# Patient Record
Sex: MALE | Race: WHITE
[De-identification: names, ages, dates, MRNs, and addresses within clinical notes are randomized per-mention and may not be internally consistent; named-entity substitution may affect disease eponyms.]

---

## 2018-04-03 ENCOUNTER — HOSPITAL ENCOUNTER (OUTPATIENT)
Dept: HOSPITAL 80 - FIMAGING | Age: 83
End: 2018-04-03
Attending: PSYCHIATRY & NEUROLOGY
Payer: COMMERCIAL

## 2018-04-03 DIAGNOSIS — R41.3: ICD-10-CM

## 2018-04-03 DIAGNOSIS — R26.9: Primary | ICD-10-CM

## 2018-04-06 ENCOUNTER — HOSPITAL ENCOUNTER (EMERGENCY)
Dept: HOSPITAL 80 - FED | Age: 83
Discharge: HOME | End: 2018-04-06
Payer: COMMERCIAL

## 2018-04-06 VITALS — DIASTOLIC BLOOD PRESSURE: 76 MMHG | SYSTOLIC BLOOD PRESSURE: 164 MMHG

## 2018-04-06 DIAGNOSIS — E11.9: ICD-10-CM

## 2018-04-06 DIAGNOSIS — Z79.84: ICD-10-CM
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
## 2018-04-06 DIAGNOSIS — E86.0: Primary | ICD-10-CM

## 2018-04-06 DIAGNOSIS — I10: ICD-10-CM

## 2018-04-06 DIAGNOSIS — I25.10: ICD-10-CM

## 2018-04-06 DIAGNOSIS — Z04.3: ICD-10-CM

## 2018-04-06 DIAGNOSIS — E86.9: ICD-10-CM

## 2018-04-06 DIAGNOSIS — Z79.82: ICD-10-CM

## 2018-04-06 LAB — PLATELET # BLD: 164 10^3/UL (ref 150–400)

## 2018-04-06 NOTE — EDPHY
H & P


Stated Complaint: FALL AT 2030,, "HAVING A BAD DAY", SLEEPY, CONFUSION


Time Seen by Provider: 04/06/18 01:04


HPI/ROS: 





Chief Complaint:  Confusion, high blood sugar, fall





HPI:  82-year-old male with history of diabetes, coronary artery disease.  Per 

family patient has been seeming a little bit more confused since 7 o'clock 

tonight.  Patient was leaning over to get a pair of the refrigerator when he 

fell to the ground.  Did not hit his head.  No loss of consciousness.  He was 

helped up by a neighbor.  Son arrived home about 11 o'clock and checked in 

notice blood pressure was in the high 200s.  No recent fevers or chills.  No 

cough.  No chest pain shortness of breath.  No syncope or fainting.  Family 

called nurse line advised them to bring him in for further evaluation.





ROS:  10 point Review of Systems is negative except as noted in the HPI.





PMH:  Hypertension, diabetes, prostate disease, coronary artery disease





Social History: No smoking, no alcohol,  no recreational drug use





Family History: non-contributory





Physical Exam:


Gen: Awake, Alert, No Distress


HEENT:  


     Nose: no rhinorrhea


     Eyes: PERRLA, EOMI


     Mouth:  Dry mucous membranes 


Neck: Supple, no JVD


Chest: nontender, lungs clear to auscultation


Heart: S1, S2 normal, no murmur


Abd: Soft, non-tender, no guarding


Back: no CVA tenderness, no midline tenderness 


Ext: no edema, non-tender


Skin: no rash


Neuro: CN II-XII intact, Sensation grossly intact, Strength 5/5 in bilateral 

upper and lower extremities





- Personal History


Current Tetanus/Diphtheria Vaccine: Unsure





- Medical/Surgical History


Hx Asthma: No


Hx Chronic Respiratory Disease: No


Hx Diabetes: Yes


Hx Cardiac Disease: Yes


Hx Renal Disease: No


Hx Cirrhosis: No


Hx Alcoholism: No


Hx HIV/AIDS: No


Hx Splenectomy or Spleen Trauma: No


Other PMH: 3 stents 1999, BPH, urinary retention x3, HIGH CHOL, CATARACT SURG, 

DIABETES





- Social History


Smoking Status: Never smoked


Constitutional: 


 Initial Vital Signs











Temperature (C)  36.5 C   04/06/18 00:43


 


Heart Rate  66   04/06/18 00:43


 


Respiratory Rate  18   04/06/18 00:43


 


Blood Pressure  131/68 H  04/06/18 00:43


 


O2 Sat (%)  97   04/06/18 00:43








 











O2 Delivery Mode               Room Air














Allergies/Adverse Reactions: 


 





pseudoephedrine HCl [From Sudafed] Allergy (Unknown, Verified 04/06/18 00:40)


 


amoxicillin [From Augmentin] Allergy (Verified 04/06/18 00:40)


 


clavulanic acid [From Augmentin] Allergy (Verified 04/06/18 00:40)


 


dextromethorphan Allergy (Verified 04/06/18 00:40)


 


guaifenesin [Guaifenesin] Allergy (Verified 04/06/18 00:40)


 


pseudoephedrine Allergy (Verified 04/06/18 00:40)


 








Home Medications: 














 Medication  Instructions  Recorded


 


Insulin Glargine [Lantus Insulin 18 unit SQ HS 02/08/12





Vial]  


 


Lisinopril [Prinivil] 20 mg PO DAILY 02/08/12


 


Metformin HCl [Metformin HCl ER] 500 mg PO DAILY 02/08/12


 


Sotalol HCl [Sotalol] 80 mg PO BID 02/08/12


 


Aspirin 81mg (*)  04/06/18


 


Atorvastatin Calcium  04/06/18


 


Docusate Sodium  04/06/18


 


Metformin 1000 mg  04/06/18


 


Tamsulosin HCl  04/06/18














Medical Decision Making


ED Course/Re-evaluation: 





Patient is improved.  He is very clinically dehydrated with elevated BUN.  

Blood sugar is new to 100. Patient is improved after a L fluid.  He is awake 

alert acting appropriately.  No signs of infection.  Will discharge with follow-

up with primary care physician, return for any concerns.  He is here with a 

signed to take him home.





- Data Points


Laboratory Results: 


 Laboratory Results





 04/06/18 01:34 





 04/06/18 01:34 





 











  04/06/18 04/06/18 04/06/18





  02:10 01:34 01:34


 


WBC    12.33 10^3/uL H 10^3/uL  





    (3.80-9.50)  


 


RBC    4.54 10^6/uL 10^6/uL  





    (4.40-6.38)  


 


Hgb    13.5 g/dL L g/dL  





    (13.7-17.5)  


 


Hct    40.1 % %  





    (40.0-51.0)  


 


MCV    88.3 fL fL  





    (81.5-99.8)  


 


MCH    29.7 pg pg  





    (27.9-34.1)  


 


MCHC    33.7 g/dL g/dL  





    (32.4-36.7)  


 


RDW    14.6 % %  





    (11.5-15.2)  


 


Plt Count    164 10^3/uL 10^3/uL  





    (150-400)  


 


MPV    10.5 fL fL  





    (8.7-11.7)  


 


Neut % (Auto)    38.7 % L %  





    (39.3-74.2)  


 


Lymph % (Auto)    46.6 % H %  





    (15.0-45.0)  


 


Mono % (Auto)    10.3 % %  





    (4.5-13.0)  


 


Eos % (Auto)    3.5 % %  





    (0.6-7.6)  


 


Baso % (Auto)    0.6 % %  





    (0.3-1.7)  


 


Nucleat RBC Rel Count    0.2 % %  





    (0.0-0.2)  


 


Absolute Neuts (auto)    4.77 10^3/uL 10^3/uL  





    (1.70-6.50)  


 


Absolute Lymphs (auto)    5.75 10^3/uL H 10^3/uL  





    (1.00-3.00)  


 


Absolute Monos (auto)    1.27 10^3/uL H 10^3/uL  





    (0.30-0.80)  


 


Absolute Eos (auto)    0.43 10^3/uL H 10^3/uL  





    (0.03-0.40)  


 


Absolute Basos (auto)    0.07 10^3/uL 10^3/uL  





    (0.02-0.10)  


 


Absolute Nucleated RBC    0.03 10^3/uL H 10^3/uL  





    (0-0.01)  


 


Immature Gran %    0.3 % %  





    (0.0-1.1)  


 


Immature Gran #    0.04 10^3/uL 10^3/uL  





    (0.00-0.10)  


 


Sodium      141 mEq/L mEq/L





     (135-145) 


 


Potassium      4.9 mEq/L mEq/L





     (3.5-5.2) 


 


Chloride      104 mEq/L mEq/L





     () 


 


Carbon Dioxide      27 mEq/l mEq/l





     (22-31) 


 


Anion Gap      10 mEq/L mEq/L





     (8-16) 


 


BUN      32 mg/dL H mg/dL





     (7-23) 


 


Creatinine      1.0 mg/dL mg/dL





     (0.7-1.3) 


 


Estimated GFR      > 60 





    


 


Glucose      230 mg/dL H mg/dL





     () 


 


Calcium      9.4 mg/dL mg/dL





     (8.5-10.4) 


 


Total Bilirubin      0.5 mg/dL mg/dL





     (0.1-1.4) 


 


AST      20 IU/L IU/L





     (17-59) 


 


ALT      24 IU/L IU/L





     (21-72) 


 


Alkaline Phosphatase      59 IU/L IU/L





     () 


 


Total Protein      6.0 g/dL L g/dL





     (6.3-8.2) 


 


Albumin      3.8 g/dL g/dL





     (3.5-5.0) 


 


Urine Color  YELLOW     





    


 


Urine Appearance  CLEAR     





    


 


Urine pH  5.0     





   (5.0-7.5)   


 


Ur Specific Gravity  1.022     





   (1.002-1.030)   


 


Urine Protein  1+  H     





   (NEGATIVE)   


 


Urine Ketones  NEGATIVE     





   (NEGATIVE)   


 


Urine Blood  NEGATIVE     





   (NEGATIVE)   


 


Urine Nitrate  NEGATIVE     





   (NEGATIVE)   


 


Urine Bilirubin  NEGATIVE     





   (NEGATIVE)   


 


Urine Urobilinogen  NEGATIVE EU EU    





   (0.2-1.0)   


 


Ur Leukocyte Esterase  NEGATIVE     





   (NEGATIVE)   


 


Urine RBC  NONE SEEN /hpf /hpf    





   (0-3)   


 


Urine WBC  1-3 /hpf /hpf    





   (0-3)   


 


Ur Epithelial Cells  NONE SEEN /lpf /lpf    





   (NONE-1+)   


 


Hyaline Casts  25-50 /lpf H /lpf    





   (0-1)   


 


Urine Mucus  TRACE /lpf /lpf    





   (NONE-1+)   


 


Urine Glucose  3+  H     





   (NEGATIVE)   











Medications Given: 


 








Discontinued Medications





Sodium Chloride (Ns)  1,000 mls @ 0 mls/hr IV ONCE ONE; Wide Open


   PRN Reason: Protocol


   Stop: 04/06/18 01:14


   Last Admin: 04/06/18 01:36 Dose:  1,000 mls








Departure





- Departure


Disposition: Home, Routine, Self-Care


Clinical Impression: 


 Dehydration, Fall





Condition: Good


Instructions:  Dehydration (ED), Fall Prevention for Older Adults (ED)


Additional Instructions: 


Follow up with primary care physician in 2-3 days for further evaluation.


Make sure you drink at least 8, 8 oz glasses of water a day.


Return to the emergency department for fainting, chest pain, shortness of breath

, nausea or vomiting, or any other concerns.


Referrals: 


Patient,NotPresent [Primary Care Provider] - As per Instructions

## 2018-05-30 NOTE — GHP
[f 
rep st]



                                                            HISTORY AND PHYSICAL





POST ADMISSION PHYSICIAN EVALUATION AND REHABILITATION TREATMENT PLAN



DATE OF ADMISSION:  05/30/2018



DATE OF EVALUATION:  05/30/2018



TIME OF EVALUATION:  1605.



REFERRING FACILITY:  [facility]



IMPAIRMENT GROUP:  1.1.



DATE OF ONSET:  05/25/2018



REFERRING PHYSICIAN:  Dr. May



CONSULTING PHYSICIANS:  He had a consultation with Neurology, Dr. Bhatia.



REHABILITATION DIAGNOSIS:  Right internal capsule cerebrovascular accident with 
left upper and lower extremity weakness.



ETIOLOGIC DIAGNOSIS:  Left body involvement (right brain).



HISTORY OF PRESENT ILLNESS:  This patient presented with left-sided weakness 
and slurred speech and was admitted to [facility] under a 
stroke alert.  Brain imaging with MRI showed an acute right posterior internal 
capsule CVA.  EKG showed J point elevation in V1 to V3 and T-wave inversion in 
the inferior lateral leads.  Troponin was negative.  Echocardiogram showed 
normal ventricular size and systolic function, ejection fraction of 68%, mild-to
-moderate concentric LVH, and grade 1 diastolic dysfunction.  He had mild 
aortic valve stenosis with a mean aortic valve gradient of 15 mmHg.  There was 
no intracardiac shunt seen.  Though it is not reported directly in the notes 
that I have, presumably he had no dysrhythmias noted on tele monitoring.  He 
was already on lisinopril, aspirin, and atorvastatin.  Clopidogrel was added to 
reduce the likelihood of a recurrent stroke.  He had initial clinical 
deterioration including reduced ability to move his left upper extremity.  A 
repeat MRI showed interval increase in the size of the area of restricted 
diffusion from 3.5 x 5.5 mm to 10 x 6 mm suggesting infarct extension, but 
there was no hemorrhage or midline shift.  He was seen by therapies, was 
medically stable, and was ready for discharge to inpatient rehabilitation.



OTHER STUDIES AND LABS DURING HIS STAY:  On the day of discharge from the 
hospital, 05/30/2018, basic metabolic profile was overall within normal limits.
  He had an elevated glucose at 150.  Creatinine was 0.89 with estimated GFR of 
79.6.  CBC showed an elevated white blood cell count at 14.4.  This apparently 
is chronic.  There was no anemia.  Platelet count was normal.  On differential, 
there was no left shift.  CT angiogram of the head and neck was negative for 
stenosis or other vascular abnormality.  LDL was 71.



PRECAUTIONS:  He is a fall risk.  He has aspiration precautions.



ACTIVE COMORBIDITIES:  He has the tier 3 comorbidities of hemiparesis and of 
diabetes mellitus.



PAST MEDICAL HISTORY:  

1.  Coronary artery disease with history of MI.

2.  Hypertension.

3.  Diabetes mellitus type 2.

4.  Dyslipidemia.

5.  Dementia with donepezil initiated approximately 2 weeks ago.

6.  Benign prostatic hyperplasia.

7.  Spinal stenosis at the L4-L5 level.



PAST SURGICAL HISTORY:  He has had a transurethral resection of the prostate.  
He has had bilateral cataract surgery.  He has had coronary angioplasty and 
stent placement.  He has had knee surgery.



PRE-HOSPITAL MEDICATIONS:  

1.  Aspirin 81 mg p.o. daily.

2.  Atorvastatin 40 mg p.o. daily.

3.  Docusate 100 mg p.o. b.i.d.

4.  Donepezil 5 mg p.o. q.h.s.

5.  Insulin glargine.

6.  Lisinopril 20 mg p.o. daily.

7.  Metformin 1000 mg p.o. b.i.d.

8.  Sotalol 80 mg p.o. b.i.d.

9.  Tamsulosin 0.5 mg p.o. daily.



ADMISSION MEDICATIONS:  

1.  Acetaminophen 650 mg p.o. q.4 hours p.r.n.

2.  Aspirin 81 mg p.o. daily.

3.  Atorvastatin 40 mg p.o. daily.

4.  Bisacodyl 10 mg p.r. daily p.r.n.

5.  Clopidogrel 75 mg p.o. daily.

6.  Docusate 100 mg p.o. b.i.d.

7.  Insulin glargine 6 units subcutaneous q.a.m. and 6 units subcutaneous daily 
at 1600.

8.  Lisinopril 20 mg p.o. daily.

9.  Loratadine 10 mg p.o. daily.

10.  Metformin 500 mg p.o. b.i.d.

11.  Polyethylene glycol 17 g p.o. daily p.r.n.

12.  Sotalol 80 mg p.o. b.i.d.

13.  Tamsulosin 0.4 mg p.o. daily.



ALLERGIES:  Are listed to pseudoephedrine, amoxicillin, clavulanic acid, 
dextromethorphan, and guaifenesin.



PSYCHOSOCIAL HISTORY:  He is .  He and his wife split their time between 
[place] and [place].  Their son lives in [place].  He has worked as an 
.  Is originally from [facility] [facility] and came to [place] in [place].  He served 
in the  in the [employer].  He is a nonsmoker.  He 
enjoys about a half bottle of beer with dinner every night.



FAMILY HISTORY:  Noncontributory.



REVIEW OF SYSTEMS:  He reports he has had a cough and was started on a 
medication in the hospital for it.  He denies dyspnea.  He is aware of his 
inability to move the left side of his body.  He reports this worsened during 
his hospitalization.  He denies loss of sensation.  He denies headache or 
vision changes.  He denies difficulty swallowing.  He denies chest pain or 
palpitations, fevers or chills, nausea, vomiting, constipation, or diarrhea.  
He has a reduced appetite.  He says his feet are always cold, especially his 
right foot.  Otherwise, a 10-point review of systems is negative.



PHYSICAL EXAMINATION:  VITAL SIGNS:  Vitals are not yet available in the chart.
  His systolic blood pressure however was 180 when first measured on arrival in 
the unit.  His weight this morning at the hospital was 68.9 kg.  His vitals 
this morning at the hospital:  Blood pressure was 174/80, heart rate was 61, 
respiratory rate was 18, oxygen saturation was 95% on room air.  Temperature 
was 36 degrees centigrade.  GENERAL:  This is a well-nourished, well-developed, 
elderly man, appears his chronologic age, sitting in a chair in hospital gown, 
cooperative, and in no acute distress.  HEENT:  Extraocular movements are 
intact.  Pupils are equal, round, reactive to light.  Mucous membranes are 
moist.  Dentition is in good condition.  He has an uncrowded airway, Mallampati 
class 2.  There is no posterior oropharyngeal mucus.  NECK:  Supple.  HEART:  
There is a regular rate and rhythm.  There is a 1/6 to 2/6 systolic ejection 
murmur at the left sternal border.  LUNGS:  Clear to auscultation bilaterally.  
ABDOMEN:  Soft, nontender, nondistended with normoactive bowel sounds and no 
hepatosplenomegaly.  EXTREMITIES:  His feet are mildly cyanotic.  There is no 
clubbing or edema.  Radial pulses are 2+ bilaterally, and dorsalis pedis pulses 
are 1+ bilaterally.  NEUROLOGIC:  He is alert and oriented x3.  Cranial nerves 2
-12 are grossly intact.  He has mildly slurred speech.  He has flaccid 
paralysis of the left upper and lower extremities.  Sensation is intact to 
light touch.  Deep tendon reflexes are 2+ bilaterally at the patella and absent 
bilaterally at the Achilles tendons.  Plantar reflex is indeterminate 
bilaterally.  There is no increase in tone.  There is no tremor.  SKIN:  He has 
an approximately 2 cm abrasion on his left posterior thorax over approximately 
T8 or 9.  Otherwise, there is no skin breakdown.



CURRENT LEVEL OF FUNCTION PER THE PRE-ADMISSION SCREEN:  Regarding diet, 
feeding and swallowing, there was no reported swallowing deficit.  He required 
minimal assist for self-feeding.  Regarding dressing, he required maximal 
assist for lower body and minimal assist with for upper body with cues for 
spinal precautions.  For toileting, he was dependent for clothing management 
and hygiene.  Regarding continence, he had episodes of bowel incontinence 
during a therapy session.  Bed mobility required moderate assist for logroll.  
Transfers required moderate assist.  He used a front-wheeled walker and 
wheelchair.  Regarding balance, he needed assistance with sitting balance and 
to attain and maintain midline.  For standing balance, he needed assistance of 
2 with a front-wheeled walker.  Regarding endurance, he had decreased activity 
tolerance.  He was able to stand for approximately 1 minute.  Gait required 
maximal assist with facilitation for pre-gait activities.  Regarding cognition, 
he was noted to be oriented and to follow multistep commands with increased 
time.  He needed assistance to identify errors.  He had decreased awareness of 
deficits.  It was noted that he is left handed. 



On today's exam, there are no significant changes from the preadmission screen.



IMPRESSION:  This is an 82-year-old man with a history of hypertension, diabetes
, dyslipidemia, and coronary artery disease, who suffered a cerebrovascular 
accident to the right internal capsule on 05/25/2018, causing left upper and 
lower extremity hemiparesis.  Hospital evaluation included an echocardiogram 
with no embolic source, head and neck CT angiogram with no embolic source and 
no stenosis, and presumably telemetry monitoring with no report of 
dysrhythmias.  The stroke happened while he was on aspirin, so clopidogrel was 
added for secondary prevention.  He was medically stabilized, participating in 
therapies, and appropriate for inpatient rehabilitation. 



His goal is to complete a rehabilitation stay and then discharge to his son's 
home in [place] with family and home care services.  For a safe discharge, it 
is anticipated that he will require supervision with bed mobility, achieve 
standby assist level for transfers, be able to ambulate for household distances 
with the least restrictive device with contact guard, will be able to negotiate 
a flight of stairs with a rail on the right or cane with contact guard assist, 
and to be able to propel a wheelchair independently.  It is likely he will 
require supervision and cues for bathing and dressing. 



He will receive therapy with physical therapy, occupational therapy, and speech 
and language pathology for 60 minutes per day for each discipline on 5-7 days 
of the week.  His anticipated length of stay is 14 days. 



It is anticipated that upon discharge he will continue to benefit from home 
health services including an aide, occupational therapy, and physical therapy.



PLAN:  

1.  CVA, right internal capsule, with left upper and lower extremity 
hemiparesis in a left-hand-dominant man.  PT and OT to optimize mobility and 
activities of daily living to the standby assist or contact guard assist level.

2.  Dysarthria and history of dementia to be evaluated further by Speech and 
Language Pathology.

3.  Secondary prevention of cerebrovascular accident/neuro recovery.  Continue 
aspirin, clopidogrel, blood pressure and lipid control.  Will initiate 
fluoxetine for neuro recovery at 10 mg daily beginning tomorrow, 05/31/2018.  
If it is tolerated, we will titrate to 20 mg p.o. daily.

4.  Hypertension.  Continue lisinopril.  Blood pressure is elevated upon 
arrival at the rehabilitation unit.  However, this may be due to the stresses 
of his trip from [place].  Continue to monitor blood pressures and will 
adjust and add medications as necessary to achieve target of less than 140/90.

5.  Dyslipidemia.  Continue atorvastatin.

6.  History of dementia, mild.  Per hospital records, the SLUMS was 
administered and he scored 17/30.  He has been on donepezil 5 mg q.h.s. for 
approximately 2 weeks, and this will be continued.

7.  History of cardiac dysrhythmia, unclear what the rhythm abnormality was.  
He is on sotalol, which also contributes to blood pressure control.

8.  Risk for QT prolongation with concurrent donepezil and sotalol.  Will 
obtain a copy of EKG from [facility].  If there is no acute 
T prolongation, will make no change in these medications.

9.  Possible peripheral vascular disease with cyanotic and cold feet.  
Secondary prevention for cerebrovascular accident and coronary artery disease 
will also serve to prevent worsening of circulation to his lower extremities.

10.  Cough, presumably due to seasonal allergies, as he was begun on an 
antihistamine at the hospital.  Will continue cetirizine per hospital 
formulary.  If cough continues, we will evaluate further.

11.  Diabetes mellitus type 2.  Continue metformin at 500 mg p.o. b.i.d.  
Continue insulin glargine at 6 units twice daily.  Will likely plan to 
consolidate this as a single h.s. dose.  Will initiate sliding scale with 
insulin lispro and adjust medications as needed.  A reasonable goal would be to 
see if he can be managed on oral antihyperglycemics alone.  Per hospital 
discharge information, his hemoglobin A1c was 8.5%.  It would be in his 
interest to have improved glucose control, though given his age and 
comorbidities, a reasonable goal would be 8%.

12.  Chronic leukocytosis.  He had no signs or symptoms of infection in the 
hospital.  The differential was primary lymphocytes, and the hospital 
physicians queried whether he has an underlying leukemia, and per hospital 
records, the wife reported that he had a long history of leukocytosis, had seen 
oncologist in [place], and no further evaluation or treatment was advised 
at that time.

13.  Spinal stenosis.  He has been placed on spinal precautions.  It is unclear 
if these are truly necessary, but it is worthwhile to prevent any complications 
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 
central canal stenosis.

14.  Prophylaxis.  He is at elevated risk for DVT given his age and 
hemiparesis.  Will initiate enoxaparin 40 mg subcutaneous daily and will 
provide sequential compression devices to his legs at night.



FOLLOWUP:  He had followup advised in 2 weeks with the neurologist in [place], Dr. Bhatia, though it seems likely that he will seek followup with a 
neurologist in [place] where his son lives.



PRIMARY CARE PROVIDER:  Zara P. Frankel, MD, in [place].





Job #:  [id number]

MTDD

## 2018-05-31 NOTE — PDOREHIP
Admission [facility]





- Admission - 3 Day Assessment Period


Admission Date/Day 1: 05/30/18


Day 2: 05/31/18


Day 3: 06/01/18





- Active Diagnoses


Comorbidities and Co-existing Conditions at Admission: 03007. DM (e.g. diabetic 

retinopathy, nephropathy, and neuropathy)





- Skin Conditions


Unhealed Pressure Ulcer (1 or more/Stage 1 or >)-Admission: 0. No

## 2018-05-31 NOTE — SOAPPROG
SOAP Progress Note


Assessment/Plan: 


Assessment:





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 

hemiparesis in a left-hand-dominant man.  


* PT and OT to optimize mobility and activities of daily living to the standby 

assist or contact guard assist level.





Dysarthria and history of dementia to be evaluated further by Speech and 

Language Pathology.





Secondary prevention of cerebrovascular accident/neuro recovery.  


* Continue aspirin, clopidogrel, blood pressure and lipid control.  


* Initiate fluoxetine for neuro recovery at 10 mg daily beginning 05/31/2018.  

If it is tolerated, will titrate to 20 mg p.o. daily.





Hypertension.  Continue lisinopril.  Blood pressure is elevated.  


* Add amlodipine 2.5 mg at bedtime starting 5/31/2018.





Dyslipidemia.  Continue atorvastatin.





Diabetes mellitus type 2.  


* Increase metformin from 500 mg p.o. b.i.d to 850 mg BID starting 5/31/2018.  


* On insulin glargine at 6 units twice daily; change to 12 mg at HS starting 6/1 /2018..  Continue sliding scale with insulin lispro and adjust medications as 

needed.  A reasonable goal would be to see if he can be managed on oral 

antihyperglycemics alone.  


* Per hospital discharge information, his hemoglobin A1c was 8.5%.Given his age 

and comorbidities, a reasonable goal would be 8%.





History of dementia.  Per hospital records, the SLUMS was administered and he 

scored 17/30.  He has been on donepezil 5 mg q.h.s. for approximately 2 weeks, 

and this will be continued.





History of cardiac dysrhythmia, unclear what the rhythm abnormality was.  He is 

on sotalol, which also contributes to blood pressure control.





Risk for QT prolongation with concurrent donepezil and sotalol.  Reviewed EKG 

from [facility].  No QT prolongation.





Possible peripheral vascular disease with cyanotic and cold feet.  Secondary 

prevention for cerebrovascular accident and coronary artery disease will also 

serve to prevent worsening of circulation to his lower extremities.





Cough,  due to seasonal allergies.  Continue cetirizine.





Chronic leukocytosis.  He had no signs or symptoms of infection in the 

hospital.  The differential was primary lymphocytes, and the hospital 

physicians queried whether he has an underlying leukemia, and per hospital 

records, the wife reported that he had a long history of leukocytosis, had seen 

oncologist in [place], and no further evaluation or treatment was advised 

at that time.





Spinal stenosis.  He has been placed on spinal precautions.  It is unclear if 

these are truly necessary, but it is worthwhile to prevent any complications 

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 

central canal stenosis.





Prophylaxis.  He is at elevated risk for DVT given his age and hemiparesis.  

Will initiate enoxaparin 40 mg subcutaneous daily and will provide sequential 

compression devices to his legs at night.





FOLLOWUP:  He had followup advised in 2 weeks with the neurologist in [place], Dr. Bhatia, though it seems likely that he will seek followup with a 

neurologist in [place] where his son lives.





PRIMARY CARE PROVIDER:  Zara P. Frankel, MD, in [place].








05/31/18 12:22





05/31/18 14:45





Subjective: 





Inpatient about lack of improvement.  Otherwise without complaint.  He did not 

notice coughing overnight.  Has history of seasonal allergies with cough due to 

postnasal drainage.  He was on fluticasone nasal spray at 1 time for about a 

week and did not notice any improvement.  He says he did best while in the 

hospital when he was given Claritin as well as Tessalon Perle.  No dyspnea, no 

fevers or chills.


Objective: 





 Vital Signs











Temp Pulse Resp BP Pulse Ox


 


 36.6 C   58 L  18   168/86 H  93 


 


 05/31/18 08:00  05/31/18 08:50  05/31/18 08:00  05/31/18 08:50  05/31/18 08:00








 











 05/30/18 05/31/18 06/01/18





 05:59 05:59 05:59


 


Intake Total  350 


 


Output Total  430 


 


Balance  -80 














Physical Exam





- Physical Exam


General Appearance: WD/WN, alert, no apparent distress


Respiratory: normal breath sounds, No crackles, No rhonchi, No wheezing


Cardiac/Chest: regular rate, rhythm, systolic murmur, No edema


Skin: normal color, warm/dry


Neuro/Psych: alert, normal mood/affect, motor weakness (Left upper and lower 

extremities), speech abnormalities (Hypophonia and mild dysarthria)





ICD10 Worksheet


Patient Problems: 


 Problems











Problem Status Onset


 


Cerebrovascular accident (CVA) involving left cerebral hemisphere Acute  














- ICD10 Problem Qualifiers


(1) Cerebrovascular accident (CVA) involving left cerebral hemisphere

## 2018-06-01 NOTE — SOAPPROG
SOAP Progress Note


Assessment/Plan: 


Assessment:





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 

hemiparesis in a left-hand-dominant man.  


* Initial functional independence measure 38 on 6/1/2018.  Maximal assist for 

bed mobility, 2 person transfer with moderate to maximal assist.  Minimal 

assist for grooming and hygiene for seated balance.  Upper body dressing 

requires maximal assist with cues, lower body dressing requires total assist.  

He can stand at the rail with 2 person assist.  Maximal assist and cues for 

bathing. 


* Continue PT and OT to optimize mobility and activities of daily living to the 

standby assist or contact guard assist level.





Dysarthria and history of dementia.


* Working memory is very impaired.  Also with decreased attention, organization

, initiation and problem-solving.


* SLUMS in the hospital was 17/30.


* Continue Speech and Language Pathology.  Continue donepezil.





Secondary prevention of cerebrovascular accident/neuro recovery.  


* Continue aspirin, clopidogrel, blood pressure and lipid control.  


* Initiate fluoxetine for neuro recovery at 10 mg daily beginning 05/31/2018.  

If it is tolerated, will titrate to 20 mg p.o. daily.





Hypertension.  Continue lisinopril.  Blood pressure is elevated.  


* Added amlodipine 2.5 mg at bedtime starting 5/31/2018 but had low blood 

pressure at HS 5/31/2018 so discontinued amlodipine.


* Blood pressure elevated in the morning 6/1/2018.  Resume amlodipine 2.5 mg 

q.day morning dosing.





Diabetes mellitus type 2.  


* Increase metformin from 500 mg p.o. b.i.d to 850 mg BID starting 5/31/2018.  


* On insulin glargine at 6 units twice daily; fasting blood sugar lower than 

necessary 6/1/2018.  Change to 8 units at HS starting 6/1/2018..  Continue 

sliding scale with insulin lispro and adjust medications as needed.  A 

reasonable goal would be to see if he can be managed on oral antihyperglycemics 

alone.  


* Per hospital discharge information, his hemoglobin A1c was 8.5%.Given his age 

and comorbidities, a reasonable goal would be 8%.





Weight loss in recent months.


* Related to influenza, shingles and secondary Staphylococcus infection.


* Consultation with dietitian.





Bowel and bladder incontinence.


* Continue condom catheter at night.


* Scheduled toileting.





Dyslipidemia.  Continue atorvastatin.





History of cardiac dysrhythmia, unclear what the rhythm abnormality was.  He is 

on sotalol, which also contributes to blood pressure control.





Risk for QT prolongation with concurrent donepezil and sotalol.  Reviewed EKG 

from [facility].  No QT prolongation.





Possible peripheral vascular disease with cyanotic and cold feet.  Secondary 

prevention for cerebrovascular accident and coronary artery disease will also 

serve to prevent worsening of circulation to his lower extremities.





Cough,  due to seasonal allergies.  Continue cetirizine.





Chronic leukocytosis.  He had no signs or symptoms of infection in the 

hospital.  The differential was primary lymphocytes, and the hospital 

physicians queried whether he has an underlying leukemia, and per hospital 

records, the wife reported that he had a long history of leukocytosis, had seen 

oncologist in [place], and no further evaluation or treatment was advised 

at that time.





Spinal stenosis.  He has been placed on spinal precautions.  It is unclear if 

these are truly necessary, but it is worthwhile to prevent any complications 

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 

central canal stenosis.





Prophylaxis.  He is at elevated risk for DVT given his age and hemiparesis.  

Will initiate enoxaparin 40 mg subcutaneous daily and will provide sequential 

compression devices to his legs at night.





DISPOSITION:  Attended staffing, 15 min.  Discussed with case management, 

dietitian, nursing, PT, OT, SLP.  Primary residence is in [place] where 

there is no local family.  He and his wife spent considerable time in [place] 

at their son's home with multiple steps to enter and steps to the bedroom in 

the house.  At present goal is either sufficient independence to return to 

[place] or ability to climb stairs for return to son's home in [place].  

Discharge date set for 6/29/2018.





FOLLOWUP:  He had followup advised in 2 weeks with the neurologist in [place], Dr. Bhatia, though it seems likely that he will seek followup with a 

neurologist in [place] where his son lives.





PRIMARY CARE PROVIDER:  Zara P. Frankel, MD, in [place].





06/01/18 11:18





Subjective: 





Complains about the placement of bars in the bathroom.  He thinks there should 

be a bar directly in front of the toilet for patient's to pull up on when 

getting off the toilet.  Otherwise without complaints.  Slept well, not in pain

, no cough or dyspnea, no fevers or chills.  Has noted return of movement to 

his left hand.  Says he gets sudden bowel urgency and does not get transferred 

to the toilet in time and thus has incontinence.


Objective: 





 Vital Signs











Temp Pulse Resp BP Pulse Ox


 


 36.3 C   61   18   162/75 H  94 


 


 06/01/18 06:28  06/01/18 09:00  06/01/18 06:28  06/01/18 09:00  06/01/18 06:28








 











 05/31/18 06/01/18 06/02/18





 05:59 05:59 05:59


 


Intake Total 350 160 360


 


Output Total 430 425 75


 


Balance -80 -265 285














- Time Spent With Patient


Time Spent With Patient: 





Greater than 35 min floor time today, including more than 50% of time in 

coordination of care during staffing meeting, and counseling patient.





Physical Exam





- Physical Exam


General Appearance: WD/WN, alert, no apparent distress


Respiratory: normal breath sounds, No crackles, No rhonchi, No wheezing


Cardiac/Chest: regular rate, rhythm, No edema, No JVD


Skin: normal color, warm/dry


Neuro/Psych: alert, normal mood/affect, motor weakness (Left upper and lower 

extremities.  Demonstrates some movement of the left fingers and left shoulder.)

, other (Appears to have reduced awareness of position of left arm.)





ICD10 Worksheet


Patient Problems: 


 Problems











Problem Status Onset


 


Cerebrovascular accident (CVA) involving left cerebral hemisphere Acute  














- ICD10 Problem Qualifiers


(1) Cerebrovascular accident (CVA) involving left cerebral hemisphere

## 2018-06-02 NOTE — SOAPPROG
SOAP Progress Note


Assessment/Plan: 


82-year-old male status post a right posterior internal capsule stroke 

diagnosed 05/25/2018, impairments in mobility, self-care, including dysarthria 

and history of dementia.





Today's update:  I met with the family for about 20 min and discussed treatment 

options for diabetes management as well as low back pain.  Also discussed with 

them the patient's history of dementia.  A total of 35 min was spent on the 

floor in the care of the patient, the majority of which was spent counseling 

and coordination of care described above.  Overall, he seems to be doing well 

in therapy but is limited by low back pain per the therapist.  He concurs with 

this as well.  Otherwise the therapist did not express other concerns.  He was 

working with 2 therapists in physical therapy on weight shifting.  Family 

endorse that he was diagnosed with some brain atrophy but did not specifically 

say he was diagnosed with dementia.  His blood pressure is well controlled today

, there are some runny is values in the record, showing 180/80 and I was told 

that these were in error.  Continue current dose of lisinopril, would consider 

amlodipine if an additional agent was necessary but would likely increase 

lisinopril 1st per Dr. Trevizo recommendation.  Continue to watch his diabetes 

very closely.  He just recently had metformin increased from 500 mg p. O. Twice 

a day to 850 mg p. O. Twice a day.  Continue the glargine and sliding scale 

insulin with the goal to reach calculate his overall dosage tomorrow with some 

mealtime insulin.  Plan to continue a condom catheter at night for now.  

Starting a low-dose gabapentin 3 times a day for low back pain management well 

they consider whether not to do acupuncture.





Additional issues reviewed but not changed today include secondary prevention 

of stroke not otherwise discussed above, weight loss, bowel and bladder 

incontinence , cardiac dysrhythmia, risk of QT prolongation, possible 

peripheral vascular disease, cough, chronic leukocytosis, DVT prophylaxis.  

Plan to discharge 06/29/2018 per Dr. Trevizo





06/02/18 16:02





Subjective: 





Chief complaint:  Rehab progress





No acute events overnight.  I spoke at length with family regarding issues 

around urinary incontinence at night, low back pain, history of dementia, 

diabetes management, management of bowel meds.  They asked for no MiraLax, they 

also asked for p.r.n. Enema.  Additional details can be found in the assessment 

and plan as well.  Patient denies any new shortness of breath or chest pain, no 

new numbness, tingling, or weakness.  He feels like his therapy is going well.  

No particular concerns.


Objective: 





 Vital Signs











Temp Pulse Resp BP Pulse Ox


 


 36.3 C   62   16   104/63   95 


 


 06/02/18 06:41  06/02/18 10:35  06/02/18 10:35  06/02/18 10:35  06/02/18 06:41








 











 06/01/18 06/02/18 06/03/18





 05:59 05:59 05:59


 


Intake Total 160 1230 


 


Output Total 425 1005 100


 


Balance -265 225 -100














Physical Exam





- Physical Exam


General Appearance: WD/WN, alert, no apparent distress, thin


EENT: No scleral icterus (R), No scleral icterus (L)


Respiratory: No respiratory distress, No accessory muscle use


Cardiac/Chest: normal peripheral pulses, regular rate, rhythm, No edema


Abdomen: non-tender, soft


Skin: normal color, warm/dry, No cyanosis, No diaphoresis


Extremities: No pedal edema, No swelling


Neuro/Psych: alert, normal mood/affect





ICD10 Worksheet


Patient Problems: 


 Problems











Problem Status Onset


 


Cerebrovascular accident (CVA) involving left cerebral hemisphere Acute

## 2018-06-03 NOTE — SOAPPROG
SOAP Progress Note


Assessment/Plan: 


82-year-old male status post a right posterior internal capsule stroke 

diagnosed 05/25/2018, impairments in mobility, self-care, including dysarthria 

and history of dementia.





Today's update:  Adjusted diabetes regimen today to include 2 units of short-

acting insulin before meals along with a lower dose sliding scale insulin, goal 

to normalize glucose and gradually increase the daily dose of insulin as 

needed.  Being careful considering he recently increased his dose of metformin.

  Also he continues to endorse some low back pain, unsure if the new gabapentin 

has been helpful.  Counseled the family that steroid injections for the spine 

are not available as an inpatient but we would pursue that after discharge.  

Recorded blood pressures are somewhat erratically, working with nursing to 

ensure that there accurate.  Otherwise started docusate every other day which 

was his home regimen.





Additional issues reviewed but not changed today include secondary prevention 

of stroke not otherwise discussed above, weight loss, bowel and bladder 

incontinence , cardiac dysrhythmia, risk of QT prolongation, possible 

peripheral vascular disease, cough, chronic leukocytosis, DVT prophylaxis.  

Plan to discharge 06/29/2018 per Dr. Trevizo





06/02/18 16:02





06/03/18 11:46





Subjective: 





Chief complaint diabetes management and rehabilitation progress





No acute events overnight.  Patient denies any new shortness of breath or chest 

pain, no new numbness, tingling, or weakness.  His glucoses run a bit high and 

he has received about a total units of sliding scale insulin in the past 24 hr, 

no episodes of hypoglycemia.  He endorses some ongoing low back pain, he is not 

sure of gabapentin has yet been helpful.  Still wanting and steroid injection 

when possible.  Therapy notes that he is participating but pain is an issue for 

him.


Objective: 





 Vital Signs











Temp Pulse Resp BP Pulse Ox


 


 36.4 C   66   16   100/64   94 


 


 06/03/18 09:30  06/03/18 09:30  06/03/18 09:30  06/03/18 09:30  06/03/18 09:30








 











 06/02/18 06/03/18 06/04/18





 05:59 05:59 05:59


 


Intake Total 1230 650 


 


Output Total 1005 225 


 


Balance 225 425 














Physical Exam





- Physical Exam


General Appearance: WD/WN, alert, no apparent distress


EENT: No scleral icterus (R), No scleral icterus (L)


Respiratory: No respiratory distress, No accessory muscle use


Cardiac/Chest: normal peripheral pulses, regular rate, rhythm, No edema


Extremities: non-tender, No pedal edema, No calf tenderness, No swelling, No 

Jennifer's sign


Neuro/Psych: alert, normal mood/affect, motor weakness





ICD10 Worksheet


Patient Problems: 


 Problems











Problem Status Onset


 


Cerebrovascular accident (CVA) involving left cerebral hemisphere Acute

## 2018-06-04 NOTE — SOAPPROG
SOAP Progress Note


Assessment/Plan: 


Assessment:





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 

hemiparesis in a left-hand-dominant man.  


* Initial functional independence measure 38 on 6/1/2018.  Maximal assist for 

bed mobility, 2 person transfer with moderate to maximal assist.  Minimal 

assist for grooming and hygiene for seated balance.  Upper body dressing 

requires maximal assist with cues, lower body dressing requires total assist.  

He can stand at the rail with 2 person assist.  Maximal assist and cues for 

bathing. 


* Continue PT and OT to optimize mobility and activities of daily living to the 

standby assist or contact guard assist level.





Dysarthria and history of dementia.


* Working memory is very impaired.  Also with decreased attention, organization

, initiation and problem-solving.


* SLUMS in the hospital was 17/30.


* Continue Speech and Language Pathology.  Continue donepezil.





Secondary prevention of cerebrovascular accident/neuro recovery.  


* Continue aspirin, clopidogrel, blood pressure and lipid control.  


* Initiate fluoxetine for neuro recovery at 10 mg daily beginning 05/31/2018.  

Titrate to 20 mg p.o. daily starting 6/5/2018.





Hypertension.  Continue lisinopril.  Blood pressure is elevated.  


* Added amlodipine 2.5 mg at bedtime starting 5/31/2018 but had low blood 

pressure at HS 5/31/2018 so discontinued amlodipine.


* Blood pressure elevated in the morning 6/1/2018.  Resume amlodipine 2.5 mg 

q.day morning dosing.





Diabetes mellitus type 2.  


* Increase metformin from 500 mg p.o. b.i.d to 850 mg BID starting 5/31/2018.  

Titrate further to 1000 mg twice daily starting 6/5/2018.


* On insulin glargine at 6 units twice daily; fasting blood sugar lower than 

necessary 6/1/2018.  Change to 8 units at HS starting 6/1/2018..  Continue 

sliding scale with insulin lispro and adjust medications as needed.  Insulin 

lispro 2 units three times daily with meals started 6/3/2018.  


* Per hospital discharge information, his hemoglobin A1c was 8.5%.Given his age 

and comorbidities, a reasonable goal would be 8%.





Weight loss in recent months.


* Related to influenza, shingles and secondary Staphylococcus infection.


* Consultation with dietitian.





Bowel and bladder incontinence.


* Continue condom catheter at night.


* Scheduled toileting.


* Bladder scan to rule out urinary retention.  Urinary incontinence might be 

related to donepezil.  If there is no urinary retention, consider discussion 

with patient and family regarding risks and benefits of donepezil.





Dyslipidemia.  Continue atorvastatin.





History of cardiac dysrhythmia, unclear what the rhythm abnormality was.  He is 

on sotalol, which also contributes to blood pressure control.





Risk for QT prolongation with concurrent donepezil and sotalol.  Reviewed EKG 

from [facility] [place], operated by [facility].  No QT prolongation.





Possible peripheral vascular disease with cyanotic and cold feet.  Secondary 

prevention for cerebrovascular accident and coronary artery disease will also 

serve to prevent worsening of circulation to his lower extremities.





Cough,  due to seasonal allergies.  Continue cetirizine.





Chronic leukocytosis.  He had no signs or symptoms of infection in the 

hospital.  The differential was primary lymphocytes, and the hospital 

physicians queried whether he has an underlying leukemia, and per hospital 

records, the wife reported that he had a long history of leukocytosis, had seen 

oncologist in [place], and no further evaluation or treatment was advised 

at that time.





Spinal stenosis.  He has been placed on spinal precautions.  It is unclear if 

these are truly necessary, but it is worthwhile to prevent any complications 

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 

central canal stenosis.





Prophylaxis.  He is at elevated risk for DVT given his age and hemiparesis.  

Will initiate enoxaparin 40 mg subcutaneous daily and will provide sequential 

compression devices to his legs at night.





DISPOSITION:  Primary residence is in [place] where there is no local 

family.  He and his wife spent considerable time in [place] at their son's home 

with multiple steps to enter and steps to the bedroom in the house.  At present 

goal is either sufficient independence to return to [place] or ability to 

climb stairs for return to son's home in [place].  Discharge date set for 6/29/ 2018.





FOLLOWUP:  He had followup advised in 2 weeks with the neurologist in [place], Dr. Bhatia, though it seems likely that he will seek followup with a 

neurologist in [place] where his son lives.





PRIMARY CARE PROVIDER:  Zara P. Frankel, MD, in [place].





06/04/18 12:47





Subjective: 





Complains of intermittent left thigh pain.  Happens once moving through certain 

positions.  Was begun on gabapentin yesterday for possible neurogenic pain 

related to lumbar spinal degenerative disease.  He reports a little better 

today.  Otherwise without complaints, other than frustration regarding the slow 

rate of his progress.


Objective: 





 Vital Signs











Temp Pulse Resp BP Pulse Ox


 


 36.5 C   60   15   118/82 H  96 


 


 06/04/18 08:00  06/04/18 09:22  06/04/18 08:00  06/04/18 09:22  06/04/18 08:00








 











 06/03/18 06/04/18 06/05/18





 05:59 05:59 05:59


 


Intake Total 650 870 440


 


Output Total 225 800 550


 


Balance 425 70 -110














Physical Exam





- Physical Exam


General Appearance: WD/WN, alert, no apparent distress


Respiratory: normal breath sounds, No crackles, No rhonchi, No wheezing


Cardiac/Chest: regular rate, rhythm, systolic murmur


Skin: normal color, warm/dry


Neuro/Psych: alert, normal mood/affect, oriented x 3, motor weakness (Left 

upper and lower extremities)





ICD10 Worksheet


Patient Problems: 


 Problems











Problem Status Onset


 


Cerebrovascular accident (CVA) involving left cerebral hemisphere Acute  














- ICD10 Problem Qualifiers


(1) Cerebrovascular accident (CVA) involving left cerebral hemisphere

## 2018-06-05 NOTE — SOAPPROG
SOAP Progress Note


Assessment/Plan: 


82-year-old male status post a right posterior internal capsule stroke 

diagnosed 05/25/2018, impairments in mobility, self-care, including dysarthria 

and history of dementia.





Today's update:  Again adjusting diabetes regimen now to be 9 units of glargine 

daily with 3 units of short-acting insulin before meals.  Continuing low-dose 

sliding scale insulin.  Glucose is still been a bit high but no episodes of 

hypoglycemia.  Using caution since he is also going up on the dose of 

metformin.  Back pain was not a concern to the patient today, sharing imaging 

with him of his acute stroke.  A total of 35 min was spent on the floor in the 

care of the patient, the majority of which was spent in counseling and 

coordination of care regarding discussion of stroke recovery prognosis and 

expectations after rehab.





Additional issues reviewed but not changed today include secondary prevention 

of stroke not otherwise discussed above, weight loss, bowel and bladder 

incontinence , cardiac dysrhythmia, risk of QT prolongation, possible 

peripheral vascular disease, cough, chronic leukocytosis, DVT prophylaxis.  





06/02/18 16:02





06/03/18 11:46





06/05/18 10:42





Subjective: 





Chief complaint:  Rehab prognosis





No acute events overnight.  Patient denies any new shortness of breath or chest 

pain, no new numbness, tingling, or weakness.  He is asking today about his 

prognosis after stroke, I emphasized that it is a long process and that he will 

not be fully recovered by the time he leaves the hospital.  Anticipate that he 

will need some sort of assistance when he leaves.  He endorsed that he uses his 

left hand which is his affected hand, more for tasks having to do with 

mechanical projects.  I counseled him that working with the left hand on 

skilled activities will help with recovery.  He asked about strengthening 

exercises and I counseled him that strengthening exercises are not necessarily 

going to be as helpful as skilled activities.  Therapy will continue after he 

discharges from acute rehab.


Objective: 





 Vital Signs











Temp Pulse Resp BP Pulse Ox


 


 36.8 C   64   18   122/74 H  99 


 


 06/04/18 20:00  06/05/18 08:07  06/04/18 20:00  06/05/18 08:07  06/04/18 20:00








 











 06/04/18 06/05/18 06/06/18





 05:59 05:59 05:59


 


Intake Total 870 916 


 


Output Total 800 925 100


 


Balance 70 -9 -100














Physical Exam





- Physical Exam


General Appearance: WD/WN, alert, no apparent distress


EENT: No scleral icterus (R), No scleral icterus (L)


Respiratory: No respiratory distress, No accessory muscle use


Cardiac/Chest: normal peripheral pulses, regular rate, rhythm, No edema


Skin: normal color, warm/dry, No cyanosis, No diaphoresis


Extremities: non-tender, No pedal edema, No calf tenderness, No swelling


Neuro/Psych: alert, normal mood/affect, other (Left-sided hemiparesis, 0/5 

movement in his left lower limb, 4/5 in his finger flexors, extensors, wrist 

extensors, with 3/5 at the elbow flexor.  5/5 on the right)





ICD10 Worksheet


Patient Problems: 


 Problems











Problem Status Onset


 


Cerebrovascular accident (CVA) involving left cerebral hemisphere Acute

## 2018-06-06 NOTE — SOAPPROG
SOAP Progress Note


Assessment/Plan: 


Assessment:





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 

hemiparesis in a left-hand-dominant man.  


* Initial functional independence measure 38 on 6/1/2018.  Maximal assist for 

bed mobility, 2 person transfer with moderate to maximal assist.  Minimal 

assist for grooming and hygiene for seated balance.  Upper body dressing 

requires maximal assist with cues, lower body dressing requires total assist.  

He can stand at the rail with 2 person assist.  Maximal assist and cues for 

bathing. 


* Continue PT and OT to optimize mobility and activities of daily living to the 

standby assist or contact guard assist level.





Dysarthria and history of dementia.


* Working memory is very impaired.  Also with decreased attention, organization

, initiation and problem-solving.


* SLUMS in the hospital was 17/30.


* Continue Speech and Language Pathology.  Continue donepezil.





Secondary prevention of cerebrovascular accident/neuro recovery.  


* Continue aspirin, clopidogrel, blood pressure and lipid control.  


* Initiate fluoxetine for neuro recovery at 10 mg daily beginning 05/31/2018.  

Titrate to 20 mg p.o. daily starting 6/5/2018.





Hypertension.  Continue lisinopril.  Blood pressure is elevated.  


* Added amlodipine 2.5 mg at bedtime starting 5/31/2018 but had low blood 

pressure at HS 5/31/2018 so discontinued amlodipine.


* Blood pressure elevated in the morning 6/1/2018.  Resume amlodipine 2.5 mg 

q.day morning dosing.





Diabetes mellitus type 2.  


* Increase metformin from 500 mg p.o. b.i.d to 850 mg BID starting 5/31/2018.  

Titrated further to 1000 mg twice daily starting 6/5/2018.


* Continue insulin, currently with glargine 9 units at bedtime, lispro sliding 

scale, and lispro 3 units before meals.


* Per hospital discharge information, his hemoglobin A1c was 8.5%.Given his age 

and comorbidities, a reasonable goal would be 8%.





Weight loss in recent months.


* Related to influenza, shingles and secondary Staphylococcus infection.


* Consultation with dietitian.





Bowel and bladder incontinence.


* Continue condom catheter at night.


* Scheduled toileting.


* Bladder scan to rule out urinary retention.  Urinary incontinence might be 

related to donepezil.  If there is no urinary retention, consider discussion 

with patient and family regarding risks and benefits of donepezil.





Dyslipidemia.  Continue atorvastatin.





History of cardiac dysrhythmia, unclear what the rhythm abnormality was.  He is 

on sotalol, which also contributes to blood pressure control.





Risk for QT prolongation with concurrent donepezil and sotalol.  Reviewed EKG 

from [facility].  No QT prolongation.





Possible peripheral vascular disease with cyanotic and cold feet.  Secondary 

prevention for cerebrovascular accident and coronary artery disease will also 

serve to prevent worsening of circulation to his lower extremities.





Cough,  due to seasonal allergies.  Continue cetirizine.





Chronic leukocytosis.  He had no signs or symptoms of infection in the 

hospital.  The differential was primary lymphocytes, and the hospital 

physicians queried whether he has an underlying leukemia, and per hospital 

records, the wife reported that he had a long history of leukocytosis, had seen 

oncologist in [place], and no further evaluation or treatment was advised 

at that time.





Spinal stenosis.  He has been placed on spinal precautions.  It is unclear if 

these are truly necessary, but it is worthwhile to prevent any complications 

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 

central canal stenosis.





Prophylaxis.  He is at elevated risk for DVT given his age and hemiparesis.  

Will initiate enoxaparin 40 mg subcutaneous daily and will provide sequential 

compression devices to his legs at night.





DISPOSITION:  Primary residence is in [place] where there is no local 

family.  He and his wife spent considerable time in [place] at their son's home 

with multiple steps to enter and steps to the bedroom in the house.  At present 

goal is either sufficient independence to return to [place] or ability to 

climb stairs for return to son's home in [place].  Discharge date set for 6/29/ 2018.





FOLLOWUP:  He had followup advised in 2 weeks with the neurologist in [place], Dr. Bhatia, though it seems likely that he will seek followup with a 

neurologist in [place] where his son lives.





PRIMARY CARE PROVIDER:  Zara P. Frankel, MD, in [place].








06/06/18 12:43





Subjective: 





No complaints this morning.  No cough or dyspnea, no fevers or chills, not in 

pain.


Objective: 





 Vital Signs











Temp Pulse Resp BP Pulse Ox


 


 36.4 C   60   17   110/60   93 


 


 06/05/18 20:00  06/06/18 09:06  06/05/18 20:00  06/06/18 09:09  06/05/18 20:00








 











 06/05/18 06/06/18 06/07/18





 05:59 05:59 05:59


 


Intake Total 916 236 240


 


Output Total 832 787 300


 


Balance -9 -214 -60














Physical Exam





- Physical Exam


General Appearance: WD/WN, alert, no apparent distress


Respiratory: normal breath sounds, No crackles, No rhonchi, No wheezing


Cardiac/Chest: regular rate, rhythm, No edema, No diastolic murmur, No systolic 

murmur


Skin: normal color, warm/dry


Neuro/Psych: alert, normal mood/affect, oriented x 3, motor weakness (Lesions 

to the left while seated upright and does not self corrects for loss of balance 

to the left.)





ICD10 Worksheet


Patient Problems: 


 Problems











Problem Status Onset


 


Cerebrovascular accident (CVA) involving left cerebral hemisphere Acute  














- ICD10 Problem Qualifiers


(1) Cerebrovascular accident (CVA) involving left cerebral hemisphere

## 2018-06-07 NOTE — SOAPPROG
SOAP Progress Note


Assessment/Plan: 


Assessment:





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 

hemiparesis in a left-hand-dominant man.  


* Initial functional independence measure 38 on 6/1/2018.  Maximal assist for 

bed mobility, 2 person transfer with moderate to maximal assist.  Minimal 

assist for grooming and hygiene for seated balance.  Upper body dressing 

requires maximal assist with cues, lower body dressing requires total assist.  

He can stand at the rail with 2 person assist.  Maximal assist and cues for 

bathing. 


* Continue PT and OT to optimize mobility and activities of daily living to the 

standby assist or contact guard assist level.





Dysarthria and history of dementia.


* Working memory is very impaired.  Also with decreased attention, organization

, initiation and problem-solving.


* SLUMS in the hospital was 17/30.


* Continue Speech and Language Pathology.  Continue donepezil.





Secondary prevention of cerebrovascular accident/neuro recovery.  


* Continue aspirin, clopidogrel, blood pressure and lipid control.  


* Initiate fluoxetine for neuro recovery at 10 mg daily beginning 05/31/2018.  

Titrated to 20 mg p.o. daily starting 6/5/2018.





Hypertension.  Continue lisinopril 20 mg QD and amlodipine 2.5 mg QD.  Adequate 

control.





Diabetes mellitus type 2.  


* Increase metformin from 500 mg p.o. b.i.d to 850 mg BID starting 5/31/2018.  

Titrated further to 1000 mg twice daily starting 6/5/2018.


* Continue insulin, currently with glargine 9 units at bedtime, lispro sliding 

scale, and lispro 3 units before meals.


* Per hospital discharge information, his hemoglobin A1c was 8.5%.Given his age 

and comorbidities, a reasonable goal would be 8%.





Weight loss in recent months.


* Related to influenza, shingles and secondary Staphylococcus infection.


* Consultation with dietitian.





Bowel and bladder incontinence.


* Continue condom catheter at night.


* Scheduled toileting.


* Bladder scan to rule out urinary retention.  Urinary incontinence might be 

related to donepezil. 


* No urinary retention.  Consider discussion with family regarding 

discontinuation of donepezil, though it may well be improving his cognition.





Dyslipidemia.  Continue atorvastatin.





History of cardiac dysrhythmia, unclear what the rhythm abnormality was.  He is 

on sotalol, which also contributes to blood pressure control.





Risk for QT prolongation with concurrent donepezil and sotalol.  Reviewed EKG 

from [facility].  No QT prolongation.





Possible peripheral vascular disease with cyanotic and cold feet.  Secondary 

prevention for cerebrovascular accident and coronary artery disease will also 

serve to prevent worsening of circulation to his lower extremities.





Cough,  due to seasonal allergies.  Continue cetirizine.





Chronic leukocytosis.  He had no signs or symptoms of infection in the 

hospital.  The differential was primary lymphocytes, and the hospital 

physicians queried whether he has an underlying leukemia, and per hospital 

records, the wife reported that he had a long history of leukocytosis, had seen 

oncologist in [place], and no further evaluation or treatment was advised 

at that time.





Spinal stenosis.  He has been placed on spinal precautions.  It is unclear if 

these are truly necessary, but it is worthwhile to prevent any complications 

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 

central canal stenosis.


* Back pain improved with initiation of low-dose gabapentin.





Prophylaxis.  He is at elevated risk for DVT given his age and hemiparesis.  

Will initiate enoxaparin 40 mg subcutaneous daily and will provide sequential 

compression devices to his legs at night.





DISPOSITION:  Primary residence is in [place] where there is no local 

family.  He and his wife spent considerable time in [place] at their son's home 

with multiple steps to enter and steps to the bedroom in the house.  At present 

goal is either sufficient independence to return to [place] or ability to 

climb stairs for return to son's home in [place].  Discharge date set for 6/29/ 2018.





FOLLOWUP:  He had followup advised in 2 weeks with the neurologist in [place], Dr. Bhatia, though it seems likely that he will seek followup with a 

neurologist in [place] where his son lives.





PRIMARY CARE PROVIDER:  Zara P. Frankel, MD, in [place].





06/07/18 14:33





Subjective: 





Complains that he keeps falling over when he sits up.  Otherwise without 

complaints.  No fevers or chills, no cough or dyspnea.  Not in pain.  Denies 

depression.


Objective: 





 Vital Signs











Temp Pulse Resp BP Pulse Ox


 


 36.4 C   60   14   130/70 H  94 


 


 06/07/18 05:36  06/07/18 05:36  06/07/18 05:36  06/07/18 05:43  06/07/18 05:36








 











 06/06/18 06/07/18 06/08/18





 05:59 05:59 05:59


 


Intake Total 236 1350 


 


Output Total 450 750 350


 


Balance -214 600 -350














Physical Exam





- Physical Exam


General Appearance: WD/WN, alert, no apparent distress


Respiratory: normal breath sounds, No crackles, No rhonchi, No wheezing


Cardiac/Chest: regular rate, rhythm, systolic murmur, No edema, No diastolic 

murmur


Skin: normal color, warm/dry


Neuro/Psych: alert, normal mood/affect, motor weakness (Left lower and upper 

extremities)





ICD10 Worksheet


Patient Problems: 


 Problems











Problem Status Onset


 


Cerebrovascular accident (CVA) involving left cerebral hemisphere Acute  














- ICD10 Problem Qualifiers


(1) Cerebrovascular accident (CVA) involving left cerebral hemisphere

## 2018-06-08 NOTE — SOAPPROG
SOAP Progress Note


Assessment/Plan: 


Assessment:





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 

hemiparesis in a left-hand-dominant man.  


* Initial functional independence measure 38 on 6/1/2018, improved to 45 as of 6 /8/2018.  Moderate to maximal assist for transfers.  Decreased carry-over of 

strategies.  Moderate to maximal assistance for upper body dressing and maximal 

assistance for lower body dressing.  Total assist for shower transfer.  2 

person assist to transfer to commode.  She has movement of the left arm but has 

left hemineglect and no sensation in the left arm.  


* Continue PT and OT to optimize mobility and activities of daily living to the 

standby assist or contact guard assist level.





Dysarthria and history of dementia.


* Working memory is very impaired.  Also with decreased attention, organization

, initiation and problem-solving.


* SLUMS in the hospital was 11/30.


* Continue Speech and Language Pathology.  Continue donepezil.





Secondary prevention of cerebrovascular accident/neuro recovery.  


* Continue aspirin, clopidogrel, blood pressure and lipid control.  


* Initiate fluoxetine for neuro recovery at 10 mg daily beginning 05/31/2018.  

Titrated to 20 mg p.o. daily starting 6/5/2018.





Hypertension.  Continue lisinopril 20 mg QD and amlodipine 2.5 mg QD.  Adequate 

control.





Diabetes mellitus type 2.  


* Increase metformin from 500 mg p.o. b.i.d to 850 mg BID starting 5/31/2018.  

Titrated further to 1000 mg twice daily starting 6/5/2018.


* Continue insulin, currently with glargine 9 units at bedtime, lispro sliding 

scale, and lispro 3 units before meals.


* Per hospital discharge information, his hemoglobin A1c was 8.5%.Given his age 

and comorbidities, a reasonable goal would be 8%.





Weight loss in recent months.


* Related to influenza, shingles and secondary Staphylococcus infection.


* Consultation with dietitian.





Bowel and bladder incontinence.


* Continue condom catheter at night.


* Scheduled toileting.


* No urinary retention.  Consider discussion with family regarding 

discontinuation of donepezil, though it may well be improving his cognition.





Dyslipidemia.  Continue atorvastatin.





History of cardiac dysrhythmia, unclear what the rhythm abnormality was.  He is 

on sotalol, which also contributes to blood pressure control.





Risk for QT prolongation with concurrent donepezil and sotalol.  Reviewed EKG 

from [facility].  No QT prolongation.





Possible peripheral vascular disease with cyanotic and cold feet.  Secondary 

prevention for cerebrovascular accident and coronary artery disease will also 

serve to prevent worsening of circulation to his lower extremities.





Cough,  due to seasonal allergies.  Continue cetirizine.





Chronic leukocytosis.  He had no signs or symptoms of infection in the 

hospital.  The differential was primary lymphocytes, and the hospital 

physicians queried whether he has an underlying leukemia, and per hospital 

records, the wife reported that he had a long history of leukocytosis, had seen 

oncologist in [place], and no further evaluation or treatment was advised 

at that time.





Spinal stenosis.  He has been placed on spinal precautions.  It is unclear if 

these are truly necessary, but it is worthwhile to prevent any complications 

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 

central canal stenosis.


* Back pain improved with initiation of low-dose gabapentin.





Prophylaxis.  He is at elevated risk for DVT given his age and hemiparesis.  

Will initiate enoxaparin 40 mg subcutaneous daily and will provide sequential 

compression devices to his legs at night.





DISPOSITION:  Attended staffing, 15 min.  Discussed with case management, 

nursing, PT, OT, SLP.  Limited discharge disposition.  No local family support 

at his home in [place], and multiple stairs to enter and then live at son's 

home in [place].   considering skilled nursing facility discharge 

tentative discharge date of 6/14/2018.





FOLLOWUP:  He had followup advised in 2 weeks with the neurologist in [place], Dr. Bhatia, though it seems likely that he will seek followup with a 

neurologist in [place] where his son lives.





PRIMARY CARE PROVIDER:  Zara P. Frankel, MD, in [place].








06/08/18 13:12





Subjective: 





Reports that he still finds himself falling over to left and backwards.  He 

does not have awareness min begin to fall and is unable to correct.  He is 

frustrated by his slow progress.  Otherwise without complaints.  No cough or 

dyspnea, no fevers or chills, not in pain.


Objective: 





 Vital Signs











Temp Pulse Resp BP Pulse Ox


 


 36.6 C   58 L  15   138/66 H  94 


 


 06/08/18 05:43  06/08/18 09:40  06/08/18 05:43  06/08/18 09:40  06/08/18 05:43








 











 06/07/18 06/08/18 06/09/18





 05:59 05:59 05:59


 


Intake Total 1350 1171 360


 


Output Total 750 1050 


 


Balance 600 121 360














- Time Spent With Patient


Time Spent With Patient: 





Greater than 35 min floor time today, including more than 50% of time in 

coordination of care during staffing meeting, and counseling patient.





Physical Exam





- Physical Exam


General Appearance: WD/WN, alert, no apparent distress


Respiratory: normal breath sounds, No crackles, No rhonchi, No wheezing


Cardiac/Chest: regular rate, rhythm, No edema


Skin: normal color, warm/dry


Neuro/Psych: alert, normal mood/affect, oriented x 3, motor weakness (Left 

upper and lower extremities)





ICD10 Worksheet


Patient Problems: 


 Problems











Problem Status Onset


 


Cerebrovascular accident (CVA) involving left cerebral hemisphere Acute  














- ICD10 Problem Qualifiers


(1) Cerebrovascular accident (CVA) involving left cerebral hemisphere

## 2018-06-09 NOTE — HOSPPROG
Hospitalist Progress Note


Assessment/Plan: 


Assessment: 81 yo M p/w CVA 





Plan:





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 

hemiparesis in a left-hand-dominant man.  


-patient requesting additional OT work w/ RUE given that it will require 

strengthening/dexterity improvement for self-feeding and Rx admin


-son requesting PM spacing between therapy sessions of approx 1-1.5hrs so that 

patient can rest/nap (he requires afternoon naps PRIOR to CVA) and optimize 

energy at therapy sessions


-cont ASA, plavix, statin, prozac





# Dysarthria and history of dementia. Working memory is very impaired.  Also 

with decreased attention, organization, initiation and problem-solving.


-Continue donepezil





# Chronic Hypertension. Continue lisinopril 20 mg QD and amlodipine 2.5 mg QD.  

Adequate control.





# Diabetes mellitus type 2. Chronic, patient was dosing bid lantus PTA, 

currently HS and will uptitrate to 10u given gluc 150-200


-cont mealtime bolus + metformin


-patient/wife will require insulin admin teaching w/ RN and OT, as patient will 

only be able to admin w/ RUE





# Bowel and bladder incontinence. Unclear etiology, cont condom cath HS, cont 

attempts to physically assist patient when he has urge





# History of cardiac dysrhythmia, unclear what the rhythm abnormality was.  He 

is on sotalol, which also contributes to blood pressure control.





# Possible peripheral vascular disease with cyanotic and cold feet.  Secondary 

prevention for cerebrovascular accident and coronary artery disease will also 

serve to prevent worsening of circulation to his lower extremities.





# Suspected post-nasal drip. Nocturnal cough improved w/ addition of anti-

histamine, but now patient w/ daytime rhinorrhea, which may be related, or may 

be 2/2 seasonal allergies


-trial daytime flonase 2 puffs, cetirizine HS





# Spinal stenosis.  He has been placed on spinal precautions.  It is unclear if 

these are truly necessary, but it is worthwhile to prevent any complications 

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 

central canal stenosis.


-Back pain improved with initiation of low-dose gabapentin.





Diet. ADA


PPx. Lovenox 40


Code. Full


Dispo. Patient and son request to be included in/notified of team care 

conference this week, as son is performing the majority of post-discharge 

planning for the family and he would like to begin working on coordinating all 

of the details. ADD 6/14, requires ongoing therapy. 








Subjective: rhinorrhea throughout daytime, difficulty w/ feeding self


Objective: 


 Vital Signs











Temp Pulse Resp BP Pulse Ox


 


 36.4 C   58 L  16   130/65 H  94 


 


 06/09/18 06:07  06/09/18 06:07  06/09/18 06:07  06/09/18 06:07  06/09/18 06:07








 











 06/08/18 06/09/18 06/10/18





 05:59 05:59 05:59


 


Intake Total 1171 900 240


 


Output Total 1050 850 375


 


Balance 121 50 -135














- Physical Exam


Constitutional: no apparent distress, appears nourished, not in pain, No 

uncomfortable


Cardiovascular: systolic murmur (II/VI at apex, III/VI at sternum)


Respiratory: no respiratory distress, no rales or rhonchi, clear to auscultation


Gastrointestinal: normoactive bowel sounds, soft, non-tender abdomen, no 

palpable masses, No distension


Neurologic: AAOx3, sensation intact bilaterally, weakness (only movement in L 

toes, prox L shoulder movement and reduced distally), facial droop (L mouth)


Psychiatric: interacting appropriately, not anxious, not encephalopathic, 

thought process linear





ICD10 Worksheet


Patient Problems: 


 Problems











Problem Status Onset


 


Cerebrovascular accident (CVA) involving left cerebral hemisphere Acute

## 2018-06-10 NOTE — HOSPPROG
Hospitalist Progress Note


Assessment/Plan: 


Assessment: 83 yo M p/w CVA 





Plan:





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 

hemiparesis in a left-hand-dominant man.  


-patient requesting additional OT work w/ RUE given that it will require 

strengthening/dexterity improvement for self-feeding and Rx admin


-son requesting PM spacing between therapy sessions of approx 1-1.5hrs so that 

patient can rest/nap (he requires afternoon naps PRIOR to CVA) and optimize 

energy at therapy sessions


-cont ASA, plavix, statin, prozac





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 Working memory is very impaired.  Also 

with decreased attention, organization, initiation and problem-solving.


-Continue donepezil


-patient able to carry on a fluent conversation today





# Chronic Hypertension. Continue lisinopril 20 mg QD and amlodipine 2.5 mg QD.  

Adequate control.





# Diabetes mellitus type 2. Chronic, patient was dosing bid lantus PTA, 

currently HS uptitrated to 10u 


-cont mealtime bolus + metformin


-patient/wife will require insulin admin teaching w/ RN and OT, as patient will 

only be able to admin w/ RUE


- (range 110-250)





# Bowel and bladder incontinence. Unclear etiology, cont condom cath HS, cont 

attempts to physically assist patient when he has urge





# History of cardiac dysrhythmia. Cont on home sotalol





# Possible peripheral vascular disease with cyanotic and cold feet.  Secondary 

prevention for cerebrovascular accident and coronary artery disease will also 

serve to prevent worsening of circulation to his lower extremities.





# Suspected post-nasal drip. Nocturnal cough improved w/ addition of anti-

histamine, but now patient w/ daytime rhinorrhea, which may be related, or may 

be 2/2 seasonal allergies


-trial daytime flonase 2 puffs, cetirizine HS





# Spinal stenosis.  He has been placed on spinal precautions.  It is unclear if 

these are truly necessary, but it is worthwhile to prevent any complications 

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 

central canal stenosis.


-Back pain improved with initiation of low-dose gabapentin.





Diet. ADA


PPx. Lovenox 40


Code. Full


Dispo. Patient and son request to be included in/notified of team care 

conference this week, as son is performing the majority of post-discharge 

planning for the family and he would like to begin working on coordinating all 

of the details. ADD 6/14, requires ongoing therapy. 








Subjective: patient very appreciative of his attentive care during his stay


Objective: 


 Vital Signs











Temp Pulse Resp BP Pulse Ox


 


 36.6 C   61   16   120/58 L  95 


 


 06/10/18 08:00  06/10/18 08:00  06/10/18 08:00  06/10/18 08:00  06/10/18 08:00








 











 06/09/18 06/10/18 06/11/18





 05:59 05:59 05:59


 


Intake Total 900 850 380


 


Output Total 850 1575 425


 


Balance 50 -725 -45














- Physical Exam


Constitutional: no apparent distress, not in pain, chronically ill appearing, 

No uncomfortable


Cardiovascular: systolic murmur (III/VI at RSB, I/VI at apex), No irregularly 

irregular, No tachycardia, No edema


Respiratory: no respiratory distress, no rales or rhonchi, clear to auscultation


Gastrointestinal: normoactive bowel sounds, soft, non-tender abdomen, no 

palpable masses, No distension


Neurologic: AAOx3, sensation intact bilaterally, weakness (0/5 LLE, 2/5 distal 

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


Psychiatric: interacting appropriately, not anxious, not encephalopathic, 

thought process linear





ICD10 Worksheet


Patient Problems: 


 Problems











Problem Status Onset


 


Cerebrovascular accident (CVA) involving left cerebral hemisphere Acute

## 2018-06-11 NOTE — SOAPPROG
SOAP Progress Note


Assessment/Plan: 


Assessment:





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 

hemiparesis in a left-hand-dominant man.  


* Initial functional independence measure 38 on 6/1/2018, improved to 45 as of 6 /8/2018.  Moderate to maximal assist for transfers.  Decreased carry-over of 

strategies.  Moderate to maximal assistance for upper body dressing and maximal 

assistance for lower body dressing.  Total assist for shower transfer.  2 

person assist to transfer to commode.  He has movement of the left arm but has 

left hemineglect and no sensation in the left arm.  


* Continue PT and OT to optimize mobility and activities of daily living to the 

standby assist or contact guard assist level.





Dysarthria and history of dementia.


* Working memory is very impaired.  Also with decreased attention, organization

, initiation and problem-solving.


* SLUMS in the hospital was 11/30.


* Continue Speech and Language Pathology.  Continue donepezil.





Secondary prevention of cerebrovascular accident/neuro recovery.  


* Continue aspirin, clopidogrel, blood pressure and lipid control.  


* Initiate fluoxetine for neuro recovery at 10 mg daily beginning 05/31/2018.  

Titrated to 20 mg p.o. daily starting 6/5/2018.





Hypertension.  Continue lisinopril 20 mg QD and amlodipine 2.5 mg QD.  Adequate 

control.





Diabetes mellitus type 2.  


* Increased metformin from 500 mg p.o. b.i.d to 850 mg BID starting 5/31/2018.  

Titrated further to 1000 mg twice daily starting 6/5/2018.


* Blood sugars lower than necessary on several determinations yesterday, 6/10/

2018.  Reduced insulin glargine from 9 units at HS to 7 units at HS and 

eliminated scheduled insulin lispro 3 units prior to meals.  Continue insulin 

lispro sliding scale.  Continue to monitor.


* Per hospital discharge information, his hemoglobin A1c was 8.5%.Given his age 

and comorbidities, a reasonable goal would be 8%.





Weight loss in recent months.


* Related to influenza, shingles and secondary Staphylococcus infection.


* Consultation with dietitian.





Bowel and bladder incontinence.


* Continue condom catheter at night.


* Scheduled toileting.


* No urinary retention.  Consider discussion with family regarding 

discontinuation of donepezil, though it may well be improving his cognition.





Dyslipidemia.  Continue atorvastatin.





History of cardiac dysrhythmia, unclear what the rhythm abnormality was.  He is 

on sotalol, which also contributes to blood pressure control.





Risk for QT prolongation with concurrent donepezil and sotalol.  Reviewed EKG 

from [facility].  No QT prolongation.





Possible peripheral vascular disease with cyanotic and cold feet.  Secondary 

prevention for cerebrovascular accident and coronary artery disease will also 

serve to prevent worsening of circulation to his lower extremities.





Cough,  due to seasonal allergies.  Continue cetirizine.





Chronic leukocytosis.  He had no signs or symptoms of infection in the 

hospital.  The differential was primary lymphocytes, and the hospital 

physicians queried whether he has an underlying leukemia, and per hospital 

records, the wife reported that he had a long history of leukocytosis, had seen 

oncologist in [place], and no further evaluation or treatment was advised 

at that time.





Spinal stenosis.  He has been placed on spinal precautions.  It is unclear if 

these are truly necessary, but it is worthwhile to prevent any complications 

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 

central canal stenosis.


* Back pain improved with initiation of low-dose gabapentin.





Prophylaxis.  He is at elevated risk for DVT given his age and hemiparesis.  

Will initiate enoxaparin 40 mg subcutaneous daily and will provide sequential 

compression devices to his legs at night.





DISPOSITION:  Limited discharge options.  No local family support at his home 

in [place], and multiple stairs to enter and then live at son's home in 

[place].   considering skilled nursing facility discharge.  

Tentative discharge date of 6/14/2018.





FOLLOWUP:  He had followup advised in 2 weeks with the neurologist in [place], Dr. Bhatia, though it seems likely that he will seek followup with a 

neurologist in [place] where his son lives.





PRIMARY CARE PROVIDER:  Zara P. Frankel, MD, in [place].





06/11/18 15:13





Subjective: 





No complaints.  Reports he had his 1st "successful" bowel movement today; has 

previously had incontinence during his rehab stay.  Notes some return of 

movement to the left upper extremity


Objective: 





 Vital Signs











Temp Pulse Resp BP Pulse Ox


 


 36.6 C   64   16   106/60   95 


 


 06/11/18 06:07  06/11/18 08:44  06/11/18 06:07  06/11/18 08:47  06/11/18 06:07








 











 06/10/18 06/11/18 06/12/18





 05:59 05:59 05:59


 


Intake Total 850 1020 195


 


Output Total 1095 975 525


 


Balance -725 27 -742














Physical Exam





- Physical Exam


General Appearance: WD/WN, alert, no apparent distress


Respiratory: No respiratory distress, No accessory muscle use


Skin: normal color, warm/dry


Neuro/Psych: alert, normal mood/affect, oriented x 3, motor weakness (Left 

upper and lower extremities.  Demonstrates movement at the left shoulder left 

elbow and left wrist.)





ICD10 Worksheet


Patient Problems: 


 Problems











Problem Status Onset


 


Cerebrovascular accident (CVA) involving left cerebral hemisphere Acute  














- ICD10 Problem Qualifiers


(1) Cerebrovascular accident (CVA) involving left cerebral hemisphere

## 2018-06-13 NOTE — SOAPPROG
SOAP Progress Note


Assessment/Plan: 


Assessment:





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 

hemiparesis in a left-hand-dominant man.  


* Initial functional independence measure 38 on 6/1/2018, improved to 45 as of 6 /8/2018, and to 53 as of 6/13/2018.  Moderate assist for bed mobility.  Delayed 

balance reactions.  Difficulty with multitasking and maintaining balance.  

Squat pivot transfer require moderate assistance.  Ambulated 10 ft at the rail 

with 2 assist.  Upper body dressing requires minimal to moderate assistance, 

lower body dressing requires maximal assistance.  Poor motor planning.  Bath 

transfer required total assist and toileting required total assist of 2. He was 

able to bathe with moderate assistance.  He has recovered movement in all 

planes with the left upper extremity but not yet functional.


* Continue PT and OT to optimize mobility and activities of daily living to the 

standby assist or contact guard assist level.





Dysarthria and history of dementia.


* Working memory is very impaired.  Also with decreased attention, organization

, initiation and problem-solving.


* Continue Speech and Language Pathology.  Continue donepezil.





Secondary prevention of cerebrovascular accident/neuro recovery.  


* Continue aspirin, clopidogrel, blood pressure and lipid control.  


* Initiate fluoxetine for neuro recovery at 10 mg daily beginning 05/31/2018.  

Titrated to 20 mg p.o. daily starting 6/5/2018.





Hypertension.  Continue lisinopril 20 mg QD and amlodipine 2.5 mg QD.  Adequate 

control.





Diabetes mellitus type 2.  


* Increased metformin from 500 mg p.o. b.i.d to 850 mg BID starting 5/31/2018.  

Titrated further to 1000 mg twice daily starting 6/5/2018.


* Blood sugars lower than necessary on several determinations, 6/10/2018.  

Reduced insulin glargine from 9 units at HS to 7 units at HS and eliminated 

scheduled insulin lispro 3 units prior to meals.  


* Blood sugars running high, 6/12/2018-6/13/2018. Resume pre meal insulin at 2 

units 3 times a day.  Initiate sitagliptin 50 mg q.day starting 6/13/2018.  

Continue insulin lispro sliding scale.  Continue to monitor.  Goal of adequate 

glycemic control need for insulin multiple times a day while continuing insulin 

glargine at HS.


* Per hospital discharge information, his hemoglobin A1c was 8.5%.Given his age 

and comorbidities, a reasonable goal would be 8%.





Weight loss in recent months.


* Related to influenza, shingles and secondary Staphylococcus infection.


* Consultation with dietitian.





Bowel and bladder incontinence.


* Continue condom catheter at night.


* Scheduled toileting.


* No urinary retention.  Consider discussion with family regarding 

discontinuation of donepezil, though it may well be improving his cognition.





Dyslipidemia.  Continue atorvastatin.





History of cardiac dysrhythmia, unclear what the rhythm abnormality was.  He is 

on sotalol, which also contributes to blood pressure control.





Risk for QT prolongation with concurrent donepezil and sotalol.  Reviewed EKG 

from [facility].  No QT prolongation.





Possible peripheral vascular disease with cyanotic and cold feet.  Secondary 

prevention for cerebrovascular accident and coronary artery disease will also 

serve to prevent worsening of circulation to his lower extremities.





Cough,  due to seasonal allergies.  Continue cetirizine.





Chronic leukocytosis.  He had no signs or symptoms of infection in the 

hospital.  The differential was primary lymphocytes, and the hospital 

physicians queried whether he has an underlying leukemia, and per hospital 

records, the wife reported that he had a long history of leukocytosis, had seen 

oncologist in [place], and no further evaluation or treatment was advised 

at that time.





Spinal stenosis.  He has been placed on spinal precautions.  It is unclear if 

these are truly necessary, but it is worthwhile to prevent any complications 

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 

central canal stenosis.


* Back pain improved with initiation of low-dose gabapentin.





Prophylaxis.  He is at elevated risk for DVT given his age and hemiparesis.  

Will initiate enoxaparin 40 mg subcutaneous daily and will provide sequential 

compression devices to his legs at night.





DISPOSITION:  Attended staffing, 15 min.  Discussed with , dietitian

, nursing, PT, OT, SLP.  Son is intending to take him home though there are 

multiple steps to enter and within the house.  Will need considerable home 

modifications.  SNF remains on alternative.  Set discharge date of 6/29/2018.





FOLLOWUP:  He had followup advised in 2 weeks with the neurologist in [place], Dr. Bhatia, though it seems likely that he will seek followup with a 

neurologist in [place] where his son lives.





PRIMARY CARE PROVIDER:  Zara P. Frankel, MD, in [place].








06/13/18 15:20





Subjective: 





No complaints, though he wishes things would improve faster.  Sleeping well, no 

in pain, no f/c, no cough/dyspnea.  Continues to have frequent urination


Objective: 





 Vital Signs











Temp Pulse Resp BP Pulse Ox


 


 36.6 C   61   18   118/64   96 


 


 06/12/18 08:00  06/13/18 08:52  06/12/18 08:00  06/13/18 08:52  06/12/18 08:00








 











 06/12/18 06/13/18 06/14/18





 05:59 05:59 05:59


 


Intake Total 795 600 480


 


Output Total 750 425 


 


Balance 45 175 480














- Time Spent With Patient


Time Spent With Patient: 





Greater than 35 min floor time today, including more than 50% of time in 

coordination of care during staffing meeting, and counseling patient.





Physical Exam





- Physical Exam


General Appearance: WD/WN, alert, no apparent distress


Respiratory: normal breath sounds, No crackles, No rhonchi, No wheezing


Cardiac/Chest: regular rate, rhythm, No edema, No diastolic murmur, No systolic 

murmur


Skin: normal color, warm/dry


Neuro/Psych: alert, normal mood/affect, motor weakness (Left upper and lower 

extremities.), other (Taking very few steps with front wheeled walker and 

considerable assistance by physical therapist.  When distracted loses seated 

balance.)





ICD10 Worksheet


Patient Problems: 


 Problems











Problem Status Onset


 


Cerebrovascular accident (CVA) involving left cerebral hemisphere Acute  














- ICD10 Problem Qualifiers


(1) Cerebrovascular accident (CVA) involving left cerebral hemisphere

## 2018-06-14 NOTE — SOAPPROG
SOAP Progress Note


Assessment/Plan: 


Assessment:





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 

hemiparesis in a left-hand-dominant man.  


* Initial functional independence measure 38 on 6/1/2018, improved to 45 as of 6 /8/2018, and to 53 as of 6/13/2018.  Moderate assist for bed mobility.  Delayed 

balance reactions.  Difficulty with multitasking and maintaining balance.  

Squat pivot transfer require moderate assistance.  Ambulated 10 ft at the rail 

with 2 assist.  Upper body dressing requires minimal to moderate assistance, 

lower body dressing requires maximal assistance.  Poor motor planning.  Bath 

transfer required total assist and toileting required total assist of 2. He was 

able to bathe with moderate assistance.  He has recovered movement in all 

planes with the left upper extremity but not yet functional.


* Continue PT and OT to optimize mobility and activities of daily living to the 

standby assist or contact guard assist level.





Dysarthria and history of dementia.


* Working memory is very impaired.  Also with decreased attention, organization

, initiation and problem-solving.


* Continue Speech and Language Pathology.  Observe for decline in cognition 

with discontinuation of donepezil.





Secondary prevention of cerebrovascular accident/neuro recovery.  


* Continue aspirin, clopidogrel, blood pressure and lipid control.  


* Initiate fluoxetine for neuro recovery at 10 mg daily beginning 05/31/2018.  

Titrated to 20 mg p.o. daily starting 6/5/2018.





Hypertension.  Continue lisinopril 20 mg QD and amlodipine 2.5 mg QD.  Adequate 

control.





Diabetes mellitus type 2.  


* Increased metformin from 500 mg p.o. b.i.d to 850 mg BID starting 5/31/2018.  

Titrated further to 1000 mg twice daily starting 6/5/2018.


* Blood sugars lower than necessary on several determinations, 6/10/2018.  

Reduced insulin glargine from 9 units at HS to 7 units at HS and eliminated 

scheduled insulin lispro 3 units prior to meals.  


* Blood sugars running high, 6/12/2018-6/13/2018. Resume pre meal insulin at 2 

units 3 times a day.  Initiate sitagliptin 50 mg q.day starting 6/13/2018.  

Continue insulin lispro sliding scale.  Continue to monitor.  Goal of adequate 

glycemic control need for insulin multiple times a day while continuing insulin 

glargine at HS.


* Per hospital discharge information, his hemoglobin A1c was 8.5%.Given his age 

and comorbidities, a reasonable goal would be 8%.





Bowel and bladder incontinence.


* Continue condom catheter at night.


* Scheduled toileting.


* No urinary retention.  Discontinue donepezil starting 6/14/2018.  Observe for 

improvement in urinary frequency verses decline in cognition.





Weight loss in recent months.


* Related to influenza, shingles and secondary Staphylococcus infection.


* Consultation with dietitian.





Dyslipidemia.  Continue atorvastatin.





History of cardiac dysrhythmia, unclear what the rhythm abnormality was.  He is 

on sotalol, which also contributes to blood pressure control.





Risk for QT prolongation with concurrent donepezil and sotalol.  Reviewed EKG 

from [facility].  No QT prolongation.





Possible peripheral vascular disease with cyanotic and cold feet.  Secondary 

prevention for cerebrovascular accident and coronary artery disease will also 

serve to prevent worsening of circulation to his lower extremities.





Cough,  due to seasonal allergies.  Continue cetirizine.





Chronic leukocytosis.  He had no signs or symptoms of infection in the 

hospital.  The differential was primary lymphocytes, and the hospital 

physicians queried whether he has an underlying leukemia, and per hospital 

records, the wife reported that he had a long history of leukocytosis, had seen 

oncologist in [place], and no further evaluation or treatment was advised 

at that time.





Spinal stenosis.  He has been placed on spinal precautions.  It is unclear if 

these are truly necessary, but it is worthwhile to prevent any complications 

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 

central canal stenosis.


* Back pain improved with initiation of low-dose gabapentin.





Prophylaxis.  He is at elevated risk for DVT given his age and hemiparesis.  

Will initiate enoxaparin 40 mg subcutaneous daily and will provide sequential 

compression devices to his legs at night.





DISPOSITION:  Attended family meeting, 30 min.  Patient, wife, daughter, son 

and son's partner were present.  Discussed with , nursing, PT, OT, 

SLP.  Continues to need 24 hr care and ambulation is not functional.  Son 

prefers to take him home though there are multiple steps to enter and within 

the house.  Will need considerable home modifications.  SNF remains on 

alternative.  Set discharge date of 6/29/2018.





FOLLOWUP:  He had followup advised in 2 weeks with the neurologist in [place], Dr. Bhatia, though it seems likely that he will seek followup with a 

neurologist in [place] where his son lives.





PRIMARY CARE PROVIDER:  Zara P. Frankel, MD, in [place].





06/14/18 13:53





Subjective: 





Complains of fatigue.  Says he is groggy often during the day which she feels 

impairs his participation in therapies.  Does not have particular complaint 

regarding sleep.  Has improved return of movement on the left upper and lower 

extremities.  Has been starting to walk but requires assist of 2. Continues to 

have urinary frequency.


Objective: 





 Vital Signs











Temp Pulse Resp BP Pulse Ox


 


 36.4 C   64   16   150/76 H  95 


 


 06/14/18 06:11  06/14/18 08:09  06/14/18 06:11  06/14/18 08:10  06/14/18 06:11








 











 06/13/18 06/14/18 06/15/18





 05:59 05:59 05:59


 


Intake Total 600 1540 200


 


Output Total 425 850 400


 


Balance 175 690 -200














Physical Exam





- Physical Exam


General Appearance: WD/WN, alert, no apparent distress


Respiratory: normal breath sounds, No crackles, No rhonchi, No wheezing


Cardiac/Chest: regular rate, rhythm, No edema, No diastolic murmur, No systolic 

murmur


Skin: normal color, warm/dry


Neuro/Psych: alert, normal mood/affect, oriented x 3, motor weakness (Moves 

left upper extremity arm and wrist in all planes but ataxic.)





ICD10 Worksheet


Patient Problems: 


 Problems











Problem Status Onset


 


Cerebrovascular accident (CVA) involving left cerebral hemisphere Acute  














- ICD10 Problem Qualifiers


(1) Cerebrovascular accident (CVA) involving left cerebral hemisphere

## 2018-06-16 NOTE — SOAPPROG
SOAP Progress Note


Assessment/Plan: 


Assessment:


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 

hemiparesis in a left-hand-dominant man.  


* Initial functional independence measure 38 on 6/1/2018, improved to 45 as of 6 /8/2018, and to 53 as of 6/13/2018.  Moderate assist for bed mobility.  Delayed 

balance reactions.  Difficulty with multitasking and maintaining balance.  

Squat pivot transfer require moderate assistance.  Ambulated 10 ft at the rail 

with 2 assist.  Upper body dressing requires minimal to moderate assistance, 

lower body dressing requires maximal assistance.  Poor motor planning.  Bath 

transfer required total assist and toileting required total assist of 2. He was 

able to bathe with moderate assistance.  He has recovered movement in all 

planes with the left upper extremity but not yet functional.


* Continue PT and OT to optimize mobility and activities of daily living to the 

standby assist or contact guard assist level.





Dysarthria and history of dementia.


* Working memory is very impaired.  Also with decreased attention, organization

, initiation and problem-solving.


* Continue Speech and Language Pathology.  Observe for decline in cognition 

with discontinuation of donepezil.





Secondary prevention of cerebrovascular accident/neuro recovery.  


* Continue aspirin, clopidogrel, blood pressure and lipid control.  


* Initiate fluoxetine for neuro recovery at 10 mg daily beginning 05/31/2018.  

Titrated to 20 mg p.o. daily starting 6/5/2018.





Hypertension.  Continue lisinopril 20 mg QD and amlodipine 2.5 mg QD.  Adequate 

control.





Diabetes mellitus type 2.  


* Increased metformin from 500 mg p.o. b.i.d to 850 mg BID starting 5/31/2018.  

Titrated further to 1000 mg twice daily starting 6/5/2018.


* Increase sitagliptin from 50 mg q.day to 100 mg q.day starting 6/16/2018.


* Continue insulin glargine 9 units at HS.  Continue insulin lispro sliding 

scale.  Goal is to titrate oral medications for no need of daytime insulin.


* Per hospital discharge information, his hemoglobin A1c was 8.5%.Given his age 

and comorbidities, a reasonable goal would be 8%.





Bowel and bladder incontinence.


* Continue condom catheter at night.


* Scheduled toileting.


* No urinary retention.  Discontinue donepezil starting 6/14/2018.  Observe for 

improvement in urinary frequency verses decline in cognition.





Weight loss in recent months.


* Related to influenza, shingles and secondary Staphylococcus infection.


* Consultation with dietitian.





Fatigue.


* Laboratory evaluation 6/15/2018 with normal renal and hepatic function and 

normal TSH.





Anemia.  New since admission to inpatient rehabilitation.





Dyslipidemia.  Continue atorvastatin.





History of cardiac dysrhythmia, unclear what the rhythm abnormality was.  He is 

on sotalol, which also contributes to blood pressure control.





Risk for QT prolongation with concurrent donepezil and sotalol.  Reviewed EKG 

from [facility].  No QT prolongation.





Possible peripheral vascular disease with cyanotic and cold feet.  Secondary 

prevention for cerebrovascular accident and coronary artery disease will also 

serve to prevent worsening of circulation to his lower extremities.





Cough,  due to seasonal allergies.  Continue cetirizine.





Chronic leukocytosis.  He had no signs or symptoms of infection in the 

hospital.  The differential was primary lymphocytes, and the hospital 

physicians queried whether he has an underlying leukemia, and per hospital 

records, the wife reported that he had a long history of leukocytosis, had seen 

oncologist in [place], and no further evaluation or treatment was advised 

at that time.





Spinal stenosis.  He has been placed on spinal precautions.  It is unclear if 

these are truly necessary, but it is worthwhile to prevent any complications 

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 

central canal stenosis.


* Back pain improved with initiation of low-dose gabapentin.





Prophylaxis.  He is at elevated risk for DVT given his age and hemiparesis.  

Will initiate enoxaparin 40 mg subcutaneous daily and will provide sequential 

compression devices to his legs at night.























Plan:





06/16/18 13:26





Subjective: 





no new complaints. appetite not as good today


Objective: 





 Vital Signs











Temp Pulse Resp BP Pulse Ox


 


 36.9 C   64   17   134/64 H  94 


 


 06/16/18 08:00  06/16/18 08:21  06/16/18 08:00  06/16/18 08:21  06/16/18 08:00








 Laboratory Results





 06/15/18 06:00 





 06/15/18 06:00 





 











 06/15/18 06/16/18 06/17/18





 05:59 05:59 05:59


 


Intake Total 500 900 


 


Output Total 1000 100 925


 


Balance -500 800 -925














Physical Exam





- Physical Exam


General Appearance: WD/WN, alert, no apparent distress


Neck: supple


Respiratory: lungs clear, normal breath sounds


Cardiac/Chest: regular rate, rhythm, edema (1+)


Neuro/Psych: alert, normal mood/affect





ICD10 Worksheet


Patient Problems: 


 Problems











Problem Status Onset


 


Cerebrovascular accident (CVA) involving left cerebral hemisphere Acute

## 2018-06-17 NOTE — SOAPPROG
SOAP Progress Note


Assessment/Plan: 


Assessment:


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 

hemiparesis in a left-hand-dominant man.  


* Initial functional independence measure 38 on 6/1/2018, improved to 45 as of 6 /8/2018, and to 53 as of 6/13/2018.  Moderate assist for bed mobility.  Delayed 

balance reactions.  Difficulty with multitasking and maintaining balance.  

Squat pivot transfer require moderate assistance.  Ambulated 10 ft at the rail 

with 2 assist.  Upper body dressing requires minimal to moderate assistance, 

lower body dressing requires maximal assistance.  Poor motor planning.  Bath 

transfer required total assist and toileting required total assist of 2. He was 

able to bathe with moderate assistance.  He has recovered movement in all 

planes with the left upper extremity but not yet functional.


* Continue PT and OT to optimize mobility and activities of daily living to the 

standby assist or contact guard assist level.





Dysarthria and history of dementia.


* Working memory is very impaired.  Also with decreased attention, organization

, initiation and problem-solving.


* Continue Speech and Language Pathology.  Observe for decline in cognition 

with discontinuation of donepezil.





Secondary prevention of cerebrovascular accident/neuro recovery.  


* Continue aspirin, clopidogrel, blood pressure and lipid control.  


* Initiate fluoxetine for neuro recovery at 10 mg daily beginning 05/31/2018.  

Titrated to 20 mg p.o. daily starting 6/5/2018.





Hypertension.  Continue lisinopril 20 mg QD and amlodipine 2.5 mg QD.  Adequate 

control.





Diabetes mellitus type 2.  


* Increased metformin from 500 mg p.o. b.i.d to 850 mg BID starting 5/31/2018.  

Titrated further to 1000 mg twice daily starting 6/5/2018.


* Increase sitagliptin from 50 mg q.day to 100 mg q.day starting 6/16/2018.


* Continue insulin glargine 9 units at HS.  Continue insulin lispro sliding 

scale.  Goal is to titrate oral medications for no need of daytime insulin.


* Per hospital discharge information, his hemoglobin A1c was 8.5%.Given his age 

and comorbidities, a reasonable goal would be 8%.





Bowel and bladder incontinence.


* Continue condom catheter at night.


* Scheduled toileting.


* No urinary retention.  Discontinue donepezil starting 6/14/2018.  Observe for 

improvement in urinary frequency verses decline in cognition.





Weight loss in recent months.


* Related to influenza, shingles and secondary Staphylococcus infection.


* Consultation with dietitian.





Fatigue.


* Laboratory evaluation 6/15/2018 with normal renal and hepatic function and 

normal TSH.





Anemia.  New since admission to inpatient rehabilitation.





Dyslipidemia.  Continue atorvastatin.





History of cardiac dysrhythmia, unclear what the rhythm abnormality was.  He is 

on sotalol, which also contributes to blood pressure control.





Risk for QT prolongation with concurrent donepezil and sotalol.  Reviewed EKG 

from [facility].  No QT prolongation.





Possible peripheral vascular disease with cyanotic and cold feet.  Secondary 

prevention for cerebrovascular accident and coronary artery disease will also 

serve to prevent worsening of circulation to his lower extremities.





Cough,  due to seasonal allergies.  Continue cetirizine.





Chronic leukocytosis.  He had no signs or symptoms of infection in the 

hospital.  The differential was primary lymphocytes, and the hospital 

physicians queried whether he has an underlying leukemia, and per hospital 

records, the wife reported that he had a long history of leukocytosis, had seen 

oncologist in [place], and no further evaluation or treatment was advised 

at that time.





Spinal stenosis.  He has been placed on spinal precautions.  It is unclear if 

these are truly necessary, but it is worthwhile to prevent any complications 

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 

central canal stenosis.


* Back pain improved with initiation of low-dose gabapentin.





Prophylaxis.  He is at elevated risk for DVT given his age and hemiparesis.  

Will initiate enoxaparin 40 mg subcutaneous daily and will provide sequential 

compression devices to his legs at night.























Plan:





06/16/18 13:26





Subjective: 





no new complaints. little frustrated at pace of progress


Objective: 





 Vital Signs











Temp Pulse Resp BP Pulse Ox


 


 36.5 C   67   16   145/72 H  98 


 


 06/17/18 18:30  06/17/18 20:38  06/17/18 18:30  06/17/18 20:38  06/17/18 18:30








 Laboratory Results





 06/15/18 06:00 





 06/15/18 06:00 





 











 06/16/18 06/17/18 06/18/18





 05:59 05:59 05:59


 


Intake Total 900 656 716


 


Output Total 100 0823 850


 


Balance 800 -3379 134














Physical Exam





- Physical Exam


General Appearance: WD/WN, alert, no apparent distress


Neck: supple


Respiratory: lungs clear, normal breath sounds


Cardiac/Chest: regular rate, rhythm


Skin: warm/dry





ICD10 Worksheet


Patient Problems: 


 Problems











Problem Status Onset


 


Cerebrovascular accident (CVA) involving left cerebral hemisphere Acute

## 2018-06-18 NOTE — SOAPPROG
SOAP Progress Note


Assessment/Plan: 


Assessment:





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 

hemiparesis in a left-hand-dominant man.  


* Initial functional independence measure 38 on 6/1/2018, improved to 45 as of 6 /8/2018, and to 53 as of 6/13/2018.  Moderate assist for bed mobility.  Delayed 

balance reactions.  Difficulty with multitasking and maintaining balance.  

Squat pivot transfer require moderate assistance.  Ambulated 10 ft at the rail 

with 2 assist.  Upper body dressing requires minimal to moderate assistance, 

lower body dressing requires maximal assistance.  Poor motor planning.  Bath 

transfer required total assist and toileting required total assist of 2. He was 

able to bathe with moderate assistance.  He has recovered movement in all 

planes with the left upper extremity but not yet functional.


* Continue PT and OT to optimize mobility and activities of daily living to the 

standby assist or contact guard assist level.





Dysarthria and history of dementia.


* Working memory is very impaired.  Also with decreased attention, organization

, initiation and problem-solving.


* Continue Speech and Language Pathology.  No decline in cognition noted with 

discontinuation of donepezil.





Secondary prevention of cerebrovascular accident/neuro recovery.  


* Continue aspirin, clopidogrel, blood pressure and lipid control.  


* Initiate fluoxetine for neuro recovery at 10 mg daily beginning 05/31/2018.  

Titrated to 20 mg p.o. daily starting 6/5/2018.





Hypertension.  Continue lisinopril 20 mg QD and amlodipine 2.5 mg QD.  Adequate 

control.





Diabetes mellitus type 2.  


* Increased metformin from 500 mg p.o. b.i.d to 850 mg BID starting 5/31/2018.  

Titrated further to 1000 mg twice daily starting 6/5/2018.


* Increase sitagliptin from 50 mg q.day to 100 mg q.day starting 6/16/2018.


* Continue insulin glargine 9 units at HS.  Continue insulin lispro sliding 

scale.  Goal is to titrate oral medications for no need of daytime insulin.


* Per hospital discharge information, his hemoglobin A1c was 8.5%.Given his age 

and comorbidities, a reasonable goal would be 8%.





Bowel and bladder incontinence.


* Continue condom catheter at night.


* Scheduled toileting.  Trial of psyllium fiber supplement q.day as stool 

bulking agent starting 5/18/2018..


* No urinary retention.  Discontinued donepezil starting 6/14/2018 without 

improvement in urinary frequency verses decline in cognition.


* Continues with frequent urination and defecation as often as q.1 hour through 

the day.





Weight loss in recent months.


* Related to influenza, shingles and secondary Staphylococcus infection.


* Consultation with dietitian.





Fatigue.


* Laboratory evaluation 6/15/2018 with normal renal and hepatic function and 

normal TSH.





Anemia.  New since admission to inpatient rehabilitation.  


* Not iron deficient, no macrocytosis.  Likely anemia due to chronic disease or 

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  Recheck p.r.n..  


* Advise follow-up with Hematology after discharge.





Dyslipidemia.  Continue atorvastatin.





History of cardiac dysrhythmia, unclear what the rhythm abnormality was.  He is 

on sotalol, which also contributes to blood pressure control.





Risk for QT prolongation with concurrent donepezil and sotalol.  Reviewed EKG 

from [facility].  No QT prolongation.





Possible peripheral vascular disease with cyanotic and cold feet.  Secondary 

prevention for cerebrovascular accident and coronary artery disease will also 

serve to prevent worsening of circulation to his lower extremities.





Cough,  due to seasonal allergies.  Continue cetirizine.





Chronic leukocytosis.  He had no signs or symptoms of infection in the 

hospital.  The differential was primary lymphocytes, and the hospital 

physicians queried whether he has an underlying leukemia, and per hospital 

records, the wife reported that he had a long history of leukocytosis, had seen 

oncologist in [place], and no further evaluation or treatment was advised 

at that time.





Spinal stenosis.  He has been placed on spinal precautions.  It is unclear if 

these are truly necessary, but it is worthwhile to prevent any complications 

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 

central canal stenosis.


* Back pain improved with initiation of low-dose gabapentin.





Prophylaxis.  He is at elevated risk for DVT given his age and hemiparesis.  

Will initiate enoxaparin 40 mg subcutaneous daily and will provide sequential 

compression devices to his legs at night.





DISPOSITION:  Continues to need 24 hr care and ambulation is not functional.  

Son prefers to take him home though there are multiple steps to enter and 

within the house.  Will need considerable home modifications.  SNF remains an 

alternative.  Family is visit and several SNFs.  Set discharge date of 6/29/ 2018.





FOLLOWUP:  He had followup advised in 2 weeks with the neurologist in [facility] Dr. Bhatia, though it seems likely that he will seek followup with a 

neurologist in [place] where his son lives.





PRIMARY CARE PROVIDER:  Zara P. Frankel, MD, in [place].








06/15/18 10:30





06/18/18 11:05





Subjective: 





No complaints.  Sleeping well.  Not in pain.  No cough or dyspnea, no fevers or 

chills.


Objective: 





 Vital Signs











Temp Pulse Resp BP Pulse Ox


 


 36.5 C   72   16   130/70 H  98 


 


 06/17/18 18:30  06/18/18 09:00  06/17/18 18:30  06/18/18 09:00  06/17/18 18:30








 Laboratory Results





 06/15/18 06:00 





 06/15/18 06:00 





 











 06/17/18 06/18/18 06/19/18





 05:59 05:59 05:59


 


Intake Total 656 916 400


 


Output Total 7515 1300 


 


Balance -1169 -846 400














Physical Exam





- Physical Exam


General Appearance: WD/WN, alert, no apparent distress


Respiratory: normal breath sounds, No crackles, No rhonchi, No wheezing


Cardiac/Chest: regular rate, rhythm, No edema


Skin: normal color, warm/dry


Neuro/Psych: alert, normal mood/affect, oriented x 3, abnormal gait (Ambulates 

short distance with front wheeled walker and assist of 2 therapists.  Has left 

AFO and occasional left foot drag.), motor weakness (Left upper and lower 

extremities)





ICD10 Worksheet


Patient Problems: 


 Problems











Problem Status Onset


 


Cerebrovascular accident (CVA) involving left cerebral hemisphere Acute  














- ICD10 Problem Qualifiers


(1) Cerebrovascular accident (CVA) involving left cerebral hemisphere

## 2018-06-19 NOTE — SOAPPROG
SOAP Progress Note


Assessment/Plan: 


Assessment:





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 

hemiparesis in a left-hand-dominant man.  


* Initial functional independence measure 38 on 6/1/2018, improved to 45 as of 6 /8/2018, and to 53 as of 6/13/2018.  Moderate assist for bed mobility.  Delayed 

balance reactions.  Difficulty with multitasking and maintaining balance.  

Squat pivot transfer require moderate assistance.  Ambulated 10 ft at the rail 

with 2 assist.  Upper body dressing requires minimal to moderate assistance, 

lower body dressing requires maximal assistance.  Poor motor planning.  Bath 

transfer required total assist and toileting required total assist of 2. He was 

able to bathe with moderate assistance.  He has recovered movement in all 

planes with the left upper extremity but not yet functional.


* Continue PT and OT to optimize mobility and activities of daily living to the 

standby assist or contact guard assist level.





Dysarthria and history of dementia.


* Working memory is very impaired.  Also with decreased attention, organization

, initiation and problem-solving.


* Continue Speech and Language Pathology.  No decline in cognition noted with 

discontinuation of donepezil.





Secondary prevention of cerebrovascular accident/neuro recovery.  


* Continue aspirin, clopidogrel, blood pressure and lipid control.  


* Initiate fluoxetine for neuro recovery at 10 mg daily beginning 05/31/2018.  

Titrated to 20 mg p.o. daily starting 6/5/2018.





Hypertension.  Continue lisinopril 20 mg QD and amlodipine 2.5 mg QD.  Adequate 

control.





Diabetes mellitus type 2.  


* Increased metformin from 500 mg p.o. b.i.d to 850 mg BID starting 5/31/2018.  

Titrated further to 1000 mg twice daily starting 6/5/2018.


* Increase sitagliptin from 50 mg q.day to 100 mg q.day starting 6/16/2018.


* Continue insulin glargine 9 units at HS.  Continue insulin lispro sliding 

scale.  Goal is to titrate oral medications for no need of daytime insulin.


* Per hospital discharge information, his hemoglobin A1c was 8.5%.Given his age 

and comorbidities, a reasonable goal would be 8%.





Bowel and bladder incontinence.


* Continue condom catheter at night.


* Scheduled toileting.  Trial of psyllium fiber supplement q.day as stool 

bulking agent starting 6/18/2018..


* No urinary retention.  Discontinued donepezil starting 6/14/2018 without 

improvement in urinary frequency or decline in cognition.


* Continues with frequent urination and defecation as often as q.1 hour through 

the day.





Urinary frequency.


* Would not initiate bladder anticholinergic due to cognitive effects.


* Consider trial of mirabegron,  but it is not available through the hospital 

formulary.





Weight loss in recent months.


* Related to influenza, shingles and secondary Staphylococcus infection.


* Consultation with dietitian.





Fatigue.


* Laboratory evaluation 6/15/2018 with normal renal and hepatic function and 

normal TSH.





Anemia.  New since admission to inpatient rehabilitation.  


* Not iron deficient, no macrocytosis.  Likely anemia due to chronic disease or 

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  Recheck p.r.n..  


* Advise follow-up with Hematology after discharge.





Dyslipidemia.  Continue atorvastatin.





History of cardiac dysrhythmia, unclear what the rhythm abnormality was.  He is 

on sotalol, which also contributes to blood pressure control.





Risk for QT prolongation with concurrent donepezil and sotalol.  Reviewed EKG 

from [facility].  No QT prolongation.





Possible peripheral vascular disease with cyanotic and cold feet.  Secondary 

prevention for cerebrovascular accident and coronary artery disease will also 

serve to prevent worsening of circulation to his lower extremities.





Cough,  due to seasonal allergies.  Continue cetirizine.





Chronic leukocytosis.  He had no signs or symptoms of infection in the 

hospital.  The differential was primary lymphocytes, and the hospital 

physicians queried whether he has an underlying leukemia, and per hospital 

records, the wife reported that he had a long history of leukocytosis, had seen 

oncologist in [place], and no further evaluation or treatment was advised 

at that time.





Spinal stenosis.  He has been placed on spinal precautions.  It is unclear if 

these are truly necessary, but it is worthwhile to prevent any complications 

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 

central canal stenosis.


* Back pain improved with initiation of low-dose gabapentin.





Prophylaxis.  He is at elevated risk for DVT given his age and hemiparesis.  

Will initiate enoxaparin 40 mg subcutaneous daily and will provide sequential 

compression devices to his legs at night.





DISPOSITION:  Continues to need 24 hr care and ambulation is not functional.  

Son prefers to take him home though there are multiple steps to enter and 

within the house.  Will need considerable home modifications.  SNF remains an 

alternative.  Family is visit and several SNFs.  Set discharge date of 6/29/ 2018.





FOLLOWUP:  He had followup advised in 2 weeks with the neurologist in [place], Dr. Bhatia, though it seems likely that he will seek followup with a 

neurologist in [place] where his son lives.





PRIMARY CARE PROVIDER:  Zara P. Frankel, MD, in [place].





06/19/18 14:40





Subjective: 





Has not noticed any change in urinary frequency after discontinuing donepezil.  

Discussed with speech language pathology; no change in cognition noted either.  

He is otherwise without complaints other than feeling frustrated by his slow 

pace of recovery.


Objective: 





 Vital Signs











Temp Pulse Resp BP Pulse Ox


 


 36.4 C   60   16   126/60 H  96 


 


 06/19/18 06:39  06/19/18 08:02  06/19/18 06:39  06/19/18 08:04  06/19/18 06:39








 Laboratory Results





 06/15/18 06:00 





 06/15/18 06:00 





 











 06/18/18 06/19/18 06/20/18





 05:59 05:59 05:59


 


Intake Total 916 1300 400


 


Output Total 1300 1560 50


 


Balance -384 -260 350














Physical Exam





- Physical Exam


General Appearance: WD/WN, alert, no apparent distress


Respiratory: No respiratory distress, No accessory muscle use


Skin: normal color, warm/dry


Neuro/Psych: alert, normal mood/affect, oriented x 3, motor weakness (Left 

upper and lower extremities.)





ICD10 Worksheet


Patient Problems: 


 Problems











Problem Status Onset


 


Cerebrovascular accident (CVA) involving left cerebral hemisphere Acute  














- ICD10 Problem Qualifiers


(1) Cerebrovascular accident (CVA) involving left cerebral hemisphere

## 2018-06-20 NOTE — SOAPPROG
SOAP Progress Note


Assessment/Plan: 


Assessment:





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 

hemiparesis in a left-hand-dominant man.  


* Initial functional independence measure 38 on 6/1/2018, improved to 45 as of 6 /8/2018, and to 53 as of 6/13/2018.  Remains at 53 on 6/20/2018.  Moderate 

assist for bed mobility.  Delayed balance reactions.  Difficulty with 

multitasking and maintaining balance.  Squat pivot transfer require moderate 

assistance.  Ambulated 60 ft with front wheeled walker, AFO and 2 assist.  

Upper body dressing requires minimal to moderate assistance, lower body 

dressing requires maximal assistance.  Poor motor planning.  Bath transfer 

required total assist and toileting required total assist of 2. He was able to 

bathe with moderate assistance.  He has recovered movement in all planes with 

the left upper extremity but not yet functional.


* Continue PT and OT to optimize mobility and activities of daily living to the 

standby assist or contact guard assist level.





Dysarthria and history of dementia.


* Working memory is very impaired.  Also with decreased attention, organization

, initiation and problem-solving.


* SL you MS improved from 11/30 to 20/30, consistent with mild neuro cognitive 

disorder.  No decline in cognition noted with discontinuation of donepezil.


* Continue Speech and Language Pathology.  





Secondary prevention of cerebrovascular accident/neuro recovery.  


* Continue aspirin, clopidogrel, blood pressure and lipid control.  


* Initiate fluoxetine for neuro recovery at 10 mg daily beginning 05/31/2018.  

Titrated to 20 mg p.o. daily starting 6/5/2018.





Hypertension.  Continue lisinopril 20 mg QD.  Blood pressure is running 

slightly high.  Increase amlodipine from 2.5 mg to 5 mg q.day starting 6/21/ 2018..





Diabetes mellitus type 2.  


* Increased metformin from 500 mg p.o. b.i.d to 850 mg BID starting 5/31/2018.  

Titrated further to 1000 mg twice daily starting 6/5/2018.


* Increase sitagliptin from 50 mg q.day to 100 mg q.day starting 6/16/2018.


* Continue insulin glargine 9 units at HS.  Continue insulin lispro sliding 

scale.  Goal is to titrate oral medications for no need of daytime insulin.


* Per hospital discharge information, his hemoglobin A1c was 8.5%.Given his age 

and comorbidities, a reasonable goal would be 8%.





Bowel and bladder incontinence.


* Continue condom catheter at night.


* Scheduled toileting.  Trial of psyllium fiber supplement q.day as stool 

bulking agent starting 6/18/2018..


* No urinary retention.  Discontinued donepezil starting 6/14/2018 without 

improvement in urinary frequency or decline in cognition.


* Continues with frequent urination and defecation as often as q.1 hour through 

the day.





Urinary frequency.


* Would not initiate bladder anticholinergic due to cognitive effects.


* Consider trial of mirabegron,  but it is not available through the hospital 

formulary.





Weight loss in recent months.


* Related to influenza, shingles and secondary Staphylococcus infection.


* Consultation with dietitian.





Fatigue.


* Laboratory evaluation 6/15/2018 with normal renal and hepatic function and 

normal TSH.





Anemia.  New since admission to inpatient rehabilitation.  


* Not iron deficient, no macrocytosis.  Likely anemia due to chronic disease or 

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  Recheck p.r.n..  


* Advise follow-up with Hematology after discharge.





Dyslipidemia.  Continue atorvastatin.





History of cardiac dysrhythmia, unclear what the rhythm abnormality was.  He is 

on sotalol, which also contributes to blood pressure control.





Risk for QT prolongation with concurrent donepezil and sotalol.  Reviewed EKG 

from [facility].  No QT prolongation.  Donepezil has 

subsequently been discontinued.





Possible peripheral vascular disease with cyanotic and cold feet.  Secondary 

prevention for cerebrovascular accident and coronary artery disease will also 

serve to prevent worsening of circulation to his lower extremities.





Cough,  due to seasonal allergies.  Continue cetirizine.





Chronic leukocytosis.  He had no signs or symptoms of infection in the 

hospital.  The differential was primary lymphocytes, and the hospital 

physicians queried whether he has an underlying leukemia, and per hospital 

records, the wife reported that he had a long history of leukocytosis, had seen 

oncologist in [place], and no further evaluation or treatment was advised 

at that time.





Spinal stenosis.  He has been placed on spinal precautions.  It is unclear if 

these are truly necessary, but it is worthwhile to prevent any complications 

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 

central canal stenosis.


* Back pain improved with initiation of low-dose gabapentin.





Prophylaxis.  He is at elevated risk for DVT given his age and hemiparesis.  

Will initiate enoxaparin 40 mg subcutaneous daily and will provide sequential 

compression devices to his legs at night.





DISPOSITION:  Attended staffing, 15 min.  Discussed with case management, 

nursing, dietitian, PT, OT, SLP.  Continues to need 24 hr care and ambulation 

is not functional.  Son prefers to take him home though there are multiple 

steps to enter and within the house.  Would need considerable home 

modifications.  SNF remains an alternative.  Family is visit and several SNFs.  

Continue discharge date of 6/29/2018.





FOLLOWUP:  He had followup advised in 2 weeks with the neurologist in [place], Dr. Bhatia, though it seems likely that he will seek followup with a 

neurologist in [place] where his son lives.





PRIMARY CARE PROVIDER:  Zara P. Frankel, MD, in [place].








06/20/18 11:00





Subjective: 





Feels fatigued today.  Otherwise without complaint.  No cough or dyspnea, no 

fevers or chills, no dysuria.


Objective: 





 Vital Signs











Temp Pulse Resp BP Pulse Ox


 


 36.5 C   67   12   136/68 H  93 


 


 06/20/18 06:32  06/20/18 06:32  06/20/18 06:32  06/20/18 06:32  06/20/18 06:32








 Laboratory Results





 06/15/18 06:00 





 06/15/18 06:00 





 











 06/19/18 06/20/18 06/21/18





 05:59 05:59 05:59


 


Intake Total 1300 700 


 


Output Total 1560 1300 150


 


Balance -260 -600 -150














- Time Spent With Patient


Time Spent With Patient: 





Greater than 35 min floor time today, including more than 50% of time in 

coordination of care during staffing meeting, and counseling patient.





Physical Exam





- Physical Exam


General Appearance: WD/WN, alert, no apparent distress


Respiratory: normal breath sounds, No crackles, No rhonchi, No wheezing


Cardiac/Chest: regular rate, rhythm, other (Distant heart sounds), No edema


Skin: normal color, warm/dry


Neuro/Psych: alert, normal mood/affect, oriented x 3, motor weakness (Left 

upper and lower extremities)





ICD10 Worksheet


Patient Problems: 


 Problems











Problem Status Onset


 


Cerebrovascular accident (CVA) involving left cerebral hemisphere Acute  














- ICD10 Problem Qualifiers


(1) Cerebrovascular accident (CVA) involving left cerebral hemisphere

## 2018-06-21 NOTE — SOAPPROG
SOAP Progress Note


Assessment/Plan: 


Assessment:


 Kidney qi/yin deficiency, Spleen qi deficiency with damp-cold/heat, Blood 

stasis


 


Tongue: Red with yellow fur, midline cracks, dry


Pulse: thin, choppy





 Plan: Nourish kidney qi/yin, nourish spleen qi, resolve damp-cold/heat, and 

move blood stasis 


 


 Acupuncture Points: head/face scalp points, balance point scalp, shenmen, 

point zero, spine section of ear points, Upper and lower limbs right side only, 

yintang


Acupressure: Sp9











06/21/18 23:08





06/21/18 23:10





06/21/18 23:11





Subjective: 


Subjective:


82- Male, Right side CVA about 4 weeks ago. Is able to move left arm, fingers 

and leg slightly but ROM is restricted, especially in left leg, much harder to 

lift up and down. Left arm, hand and fingers are active but tend to tighten up 

as well. Feels very weak, gets tired pretty fast and tends to get thirsty but 

does not drink enough water. Tends to take naps in the afternoon. Has some 

pitting edema in legs. Balance is off, wants to work on equilibrium. Has spinal 

[id number], grade 1 degenerative spondylolisthesis and central canal 

stenosis.


Digestion: currently has bowel and bladder incontinence.


Sleep: good


Stress: fluctuates








 





Objective: 





 Vital Signs











Temp Pulse Resp BP Pulse Ox


 


 36.3 C   66   16   135/60 H  95 


 


 06/21/18 20:00  06/21/18 20:00  06/21/18 20:00  06/21/18 20:00  06/21/18 20:00








 Laboratory Results





 06/15/18 06:00 





 06/15/18 06:00 





 











 06/20/18 06/21/18 06/22/18





 05:59 05:59 05:59


 


Intake Total 700 880 568


 


Output Total 1300 1450 500


 


Balance -600 -570 68








Observation:


Skin color pale/pink.


Eyes clear, spirit high


Slight difficulty with memory


Pitting edema 





- Time Spent With Patient


Time Spent With Patient: 





Acupuncture needle retention time one hour and 30 minutes. During  acupuncture 

session patient fall asleep.





ICD10 Worksheet


Patient Problems: 


 Problems











Problem Status Onset


 


Cerebrovascular accident (CVA) involving left cerebral hemisphere Acute

## 2018-06-21 NOTE — SOAPPROG
SOAP Progress Note


Assessment/Plan: 


82-year-old male status post a right posterior internal capsule stroke 

diagnosed 05/25/2018, impairments in mobility, self-care, including dysarthria 

and history of dementia.





Today's update:  Discussed expected rehabilitation course and prognosis with 

the patient at length.  Discussed that the time course for recovery from stroke 

can vary from a few to several months.  Discussed the trajectory of improvement 

as well as expected course of neurological improvement going from proximal to 

distal.  Patient is experiencing some frustration today, some low mood.  He has 

no history of low mood or suicidal ideation and no suicidal ideation today.  

Sleeping fine.  Continue bladder management strategies including condom cath 

for now.  Recommended that he talk with  about adjustment related 

issues.





Total of 30 min spent on the floor in the care of the patient, the majority of 

which was spent counseling coordination of care regarding rehab progress





Additional issues reviewed but not changed today include secondary prevention 

of stroke not otherwise discussed above, weight loss, bowel and bladder 

incontinence , cardiac dysrhythmia, risk of QT prolongation, possible 

peripheral vascular disease, cough, chronic leukocytosis, DVT prophylaxis.  





06/02/18 16:02





06/03/18 11:46





06/05/18 10:42





06/21/18 08:59





06/21/18 09:41





Subjective: 





Chief complaint:  Frustration and low mood today





No acute events overnight.  Patient denies any new shortness of breath or chest 

pain, no new numbness, tingling, or weakness.  Today he notes that he has a 

slightly lower mood is more frustrated with his feelings of slow progress.  He 

denies any history of depression or suicidal ideation.  He also does not have 

any suicidal ideation today.  He was inquiring about typical rehab course and 

whether not the feelings he is experiencing are normal.  He also recommended 

that the staff pay attention to catheter application as there is some leakage 

at night.  He continues to have left-sided weakness but is encouraged by the 

fact that part of the body that can move can be made stronger and more 

coordinated.  He is sleeping well.  Has not spoken with lease of the social 

worker regarding mood yet.


Objective: 





 Vital Signs











Temp Pulse Resp BP Pulse Ox


 


 37.0 C   62   16   125/60 H  92 


 


 06/21/18 06:17  06/21/18 06:17  06/21/18 06:17  06/21/18 06:17  06/21/18 06:17








 Laboratory Results





 06/15/18 06:00 





 06/15/18 06:00 





 











 06/20/18 06/21/18 06/22/18





 05:59 05:59 05:59


 


Intake Total 700 880 


 


Output Total 1300 1450 


 


Balance -600 -570 














Physical Exam





- Physical Exam


General Appearance: WD/WN, alert, no apparent distress


EENT: No scleral icterus (R), No scleral icterus (L)


Respiratory: No respiratory distress, No accessory muscle use


Cardiac/Chest: normal peripheral pulses, regular rate, rhythm, No edema


Skin: normal color, warm/dry, No cyanosis, No diaphoresis


Extremities: No pedal edema, No calf tenderness, No swelling


Neuro/Psych: alert, motor weakness, No normal mood/affect (Endorses a low or 

mood this morning but had a relatively unchanged affect, he is always a bit 

flat.)





ICD10 Worksheet


Patient Problems: 


 Problems











Problem Status Onset


 


Cerebrovascular accident (CVA) involving left cerebral hemisphere Acute

## 2018-06-22 NOTE — SOAPPROG
SOAP Progress Note


Assessment/Plan: 


Assessment:





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 

hemiparesis in a left-hand-dominant man.  


* Initial functional independence measure 38 on 6/1/2018, improved to 45 as of 6 /8/2018, and to 53 as of 6/13/2018.  Remains at 53 on 6/20/2018.  Moderate 

assist for bed mobility.  Delayed balance reactions.  Difficulty with 

multitasking and maintaining balance.  Squat pivot transfer require moderate 

assistance.  Ambulated 60 ft with front wheeled walker, AFO and 2 assist.  

Upper body dressing requires minimal to moderate assistance, lower body 

dressing requires maximal assistance.  Poor motor planning.  Bath transfer 

required total assist and toileting required total assist of 2. He was able to 

bathe with moderate assistance.  He has recovered movement in all planes with 

the left upper extremity but not yet functional.


* Continue PT and OT to optimize mobility and activities of daily living to the 

standby assist or contact guard assist level.





Dysarthria and history of dementia.


* Working memory is very impaired.  Also with decreased attention, organization

, initiation and problem-solving.


* SLUMS improved from 11/30 to 20/30, consistent with mild neuro cognitive 

disorder.  No decline in cognition noted with discontinuation of donepezil.


* Continue Speech and Language Pathology.  





Secondary prevention of cerebrovascular accident/neuro recovery.  


* Continue aspirin, clopidogrel, blood pressure and lipid control.  


* Initiate fluoxetine for neuro recovery at 10 mg daily beginning 05/31/2018.  

Titrated to 20 mg p.o. daily starting 6/5/2018.





Hypertension.  Continue lisinopril 20 mg QD.  Blood pressure is running 

slightly high.  Increase amlodipine from 2.5 mg to 5 mg q.day starting 6/21/ 2018..





Diabetes mellitus type 2.  


* Increased metformin from 500 mg p.o. b.i.d to 850 mg BID starting 5/31/2018.  

Titrated further to 1000 mg twice daily starting 6/5/2018.


* Increase sitagliptin from 50 mg q.day to 100 mg q.day starting 6/16/2018.


* Continue insulin glargine 9 units at HS.  Continue insulin lispro sliding 

scale.  Goal is to titrate oral medications for no need of daytime insulin.


* Per hospital discharge information, his hemoglobin A1c was 8.5%.Given his age 

and comorbidities, a reasonable goal would be 8%.





Bowel and bladder incontinence.


* Continue condom catheter at night.


* Scheduled toileting.  Trial of psyllium fiber supplement q.day as stool 

bulking agent starting 6/18/2018..


* No urinary retention.  Discontinued donepezil starting 6/14/2018 without 

improvement in urinary frequency or decline in cognition.


* Continues with frequent urination and defecation as often as q.1 hour through 

the day.





Urinary frequency, premorbid.


* Would not initiate bladder anticholinergic due to cognitive effects.


* Consider trial of mirabegron,  but it is not available through the hospital 

formulary.





Weight loss in recent months.


* Related to influenza, shingles and secondary Staphylococcus infection.


* Consultation with dietitian.





Fatigue.


* Laboratory evaluation 6/15/2018 with normal renal and hepatic function and 

normal TSH.





Anemia.  New since admission to inpatient rehabilitation.  


* Not iron deficient, no macrocytosis.  Likely anemia due to chronic disease or 

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  Recheck p.r.n..  


* Advise follow-up with Hematology after discharge.





Dyslipidemia.  Continue atorvastatin.





History of cardiac dysrhythmia, unclear what the rhythm abnormality was.  He is 

on sotalol, which also contributes to blood pressure control.





Risk for QT prolongation with concurrent donepezil and sotalol.  Reviewed EKG 

from [facility].  No QT prolongation.  Donepezil has 

subsequently been discontinued.





Possible peripheral vascular disease with cyanotic and cold feet.  Secondary 

prevention for cerebrovascular accident and coronary artery disease will also 

serve to prevent worsening of circulation to his lower extremities.





Cough,  due to seasonal allergies.  Continue cetirizine.





Chronic leukocytosis.  He had no signs or symptoms of infection in the 

hospital.  The differential was primary lymphocytes, and the hospital 

physicians queried whether he has an underlying leukemia, and per hospital 

records, the wife reported that he had a long history of leukocytosis, had seen 

oncologist in [place], and no further evaluation or treatment was advised 

at that time.





Spinal stenosis.  He has been placed on spinal precautions.  It is unclear if 

these are truly necessary, but it is worthwhile to prevent any complications 

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 

central canal stenosis.


* Back pain improved with initiation of low-dose gabapentin.





Prophylaxis.  He is at elevated risk for DVT given his age and hemiparesis.  

Will initiate enoxaparin 40 mg subcutaneous daily and will provide sequential 

compression devices to his legs at night.





DISPOSITION: Continues to need 24 hr care and ambulation is not functional.  

Son prefers to take him home though there are multiple steps to enter and 

within the house.  Would need considerable home modifications.  SNF remains an 

alternative.  Family is visit and several SNFs.  Continue discharge date of 6/29 /2018.





FOLLOWUP:  He had followup advised in 2 weeks with the neurologist in [place], Dr. Bhatia, though it seems likely that he will seek followup with a 

neurologist in [place] where his son lives.





PRIMARY CARE PROVIDER:  Zara P. Frankel, MD, in [place].








06/22/18 11:48





Subjective: 





Continues to express frustration regarding slow progress.  Had continence of 

bowels for several days but was incontinent today.  Unclear if addition of 

fiber supplement has improved bowel continence.  Otherwise without complaints.  

Sleeping well, not in pain.


Objective: 





 Vital Signs











Temp Pulse Resp BP Pulse Ox


 


 36.7 C   64   15   164/78 H  92 


 


 06/22/18 07:14  06/22/18 08:50  06/22/18 07:14  06/22/18 08:50  06/22/18 07:14








 Laboratory Results





 06/15/18 06:00 





 06/15/18 06:00 





 











 06/21/18 06/22/18 06/23/18





 05:59 05:59 05:59


 


Intake Total 880 568 120


 


Output Total 1450 1300 350


 


Balance -570 -732 -230














Physical Exam





- Physical Exam


General Appearance: WD/WN, alert, no apparent distress


Respiratory: normal breath sounds, No crackles, No rhonchi, No wheezing


Cardiac/Chest: regular rate, rhythm, systolic murmur


Skin: normal color, warm/dry


Neuro/Psych: alert, normal mood/affect, oriented x 3, motor weakness (Starting 

to incorporate left upper extremity into gestures while talking.)





ICD10 Worksheet


Patient Problems: 


 Problems











Problem Status Onset


 


Cerebrovascular accident (CVA) involving left cerebral hemisphere Acute  














- ICD10 Problem Qualifiers


(1) Cerebrovascular accident (CVA) involving left cerebral hemisphere

## 2018-06-23 NOTE — SOAPPROG
SOAP Progress Note


Assessment/Plan: 


Assessment/Plan:


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 

hemiparesis in a left-hand-dominant man.  


* Initial functional independence measure 38 on 6/1/2018, improved to 45 as of 6 /8/2018, and to 53 as of 6/13/2018.  Remains at 53 on 6/20/2018.  Moderate 

assist for bed mobility.  Delayed balance reactions.  Difficulty with 

multitasking and maintaining balance.  Squat pivot transfer require moderate 

assistance.  Ambulated 60 ft with front wheeled walker, AFO and 2 assist.  

Upper body dressing requires minimal to moderate assistance, lower body 

dressing requires maximal assistance.  Poor motor planning.  Bath transfer 

required total assist and toileting required total assist of 2. He was able to 

bathe with moderate assistance.  He has recovered movement in all planes with 

the left upper extremity but not yet functional.


* Continue PT and OT to optimize mobility and activities of daily living to the 

standby assist or contact guard assist level.





Dysarthria and history of dementia.


* Working memory is very impaired.  Also with decreased attention, organization

, initiation and problem-solving.


* SLUMS improved from 11/30 to 20/30, consistent with mild neuro cognitive 

disorder.  No decline in cognition noted with discontinuation of donepezil.


* Continue Speech and Language Pathology.  





Secondary prevention of cerebrovascular accident/neuro recovery.  


* Continue aspirin, clopidogrel, blood pressure and lipid control.  


* Initiate fluoxetine for neuro recovery at 10 mg daily beginning 05/31/2018.  

Titrated to 20 mg p.o. daily starting 6/5/2018.





Hypertension.  Continue lisinopril 20 mg QD.  Blood pressure is running 

slightly high.  Increase amlodipine from 2.5 mg to 5 mg q.day starting 6/21/ 2018..





Diabetes mellitus type 2.  


* Increased metformin from 500 mg p.o. b.i.d to 850 mg BID starting 5/31/2018.  

Titrated further to 1000 mg twice daily starting 6/5/2018.


* Increase sitagliptin from 50 mg q.day to 100 mg q.day starting 6/16/2018.


* Continue insulin glargine 9 units at HS.  Continue insulin lispro sliding 

scale.  Goal is to titrate oral medications for no need of daytime insulin.


* Per hospital discharge information, his hemoglobin A1c was 8.5%.Given his age 

and comorbidities, a reasonable goal would be 8%.





Bowel and bladder incontinence.


* Continue condom catheter at night.


* Scheduled toileting.  Trial of psyllium fiber supplement q.day as stool 

bulking agent starting 6/18/2018..


* No urinary retention.  Discontinued donepezil starting 6/14/2018 without 

improvement in urinary frequency or decline in cognition.


* Continues with frequent urination and defecation as often as q.1 hour through 

the day.





Urinary frequency, premorbid.


* Would not initiate bladder anticholinergic due to cognitive effects.


* Consider trial of mirabegron,  but it is not available through the hospital 

formulary.





Weight loss in recent months.


* Related to influenza, shingles and secondary Staphylococcus infection.


* Consultation with dietitian.





Fatigue.


* Laboratory evaluation 6/15/2018 with normal renal and hepatic function and 

normal TSH.





Anemia.  New since admission to inpatient rehabilitation.  


* Not iron deficient, no macrocytosis.  Likely anemia due to chronic disease or 

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  Recheck p.r.n..  


* Advise follow-up with Hematology after discharge.





Dyslipidemia.  Continue atorvastatin.





History of cardiac dysrhythmia, unclear what the rhythm abnormality was.  He is 

on sotalol, which also contributes to blood pressure control.





Risk for QT prolongation with concurrent donepezil and sotalol.  Reviewed EKG 

from [facility], [place], operated by [facility].  No QT prolongation.  Donepezil has 

subsequently been discontinued.





Possible peripheral vascular disease with cyanotic and cold feet.  Secondary 

prevention for cerebrovascular accident and coronary artery disease will also 

serve to prevent worsening of circulation to his lower extremities.





Cough,  due to seasonal allergies.  Continue cetirizine.





Chronic leukocytosis.  He had no signs or symptoms of infection in the 

hospital.  The differential was primary lymphocytes, and the hospital 

physicians queried whether he has an underlying leukemia, and per hospital 

records, the wife reported that he had a long history of leukocytosis, had seen 

oncologist in [place], and no further evaluation or treatment was advised 

at that time.





Spinal stenosis.  He has been placed on spinal precautions.  It is unclear if 

these are truly necessary, but it is worthwhile to prevent any complications 

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 

central canal stenosis.


* Back pain improved with initiation of low-dose gabapentin.





Prophylaxis.  He is at elevated risk for DVT given his age and hemiparesis.  

Will initiate enoxaparin 40 mg subcutaneous daily and will provide sequential 

compression devices to his legs at night.





DISPOSITION: Continues to need 24 hr care and ambulation is not functional.  

Son prefers to take him home though there are multiple steps to enter and 

within the house.  Would need considerable home modifications.  SNF remains an 

alternative.  Family to visit several SNFs.  Continue discharge date of 6/29/ 2018.





FOLLOWUP:  He had followup advised in 2 weeks with the neurologist in [place], Dr. Bhatia, though it seems likely that he will seek followup with a 

neurologist in [place] where his son lives.





PRIMARY CARE PROVIDER:  Zara P. Frankel, MD, in [place].





May need to consider an increase of pt's amlodipine given persistently in the 

160's systolic. Today at 118 so will monitor today  - if continues then will 

increase to 10mg.  








06/23/18 10:09





06/23/18 10:12





Subjective: 


Feeling well today -had some good/thoughtful questions about strokes and 

prevention strategies. No nausea/emesis, NO fevers/chills. 





Objective: 





 Vital Signs











Temp Pulse Resp BP Pulse Ox


 


 97.4 F   63   12   118/58 L  96 


 


 06/23/18 06:15  06/23/18 09:24  06/23/18 06:15  06/23/18 09:24  06/23/18 09:24








 Laboratory Results





 06/15/18 06:00 





 06/15/18 06:00 





 











 06/22/18 06/23/18 06/24/18





 05:59 05:59 05:59


 


Intake Total 568 1020 150


 


Output Total 1300 8145 450


 


Balance -732 -655 -300














Physical Exam





- Physical Exam


General Appearance: alert, no apparent distress, other (up at the breakfast 

table)


EENT: PERRL/EOMI


Neck: full range of motion


Respiratory: lungs clear


Cardiac/Chest: regular rate, rhythm


Abdomen: normal bowel sounds, non-tender


Neuro/Psych: alert, normal mood/affect





ICD10 Worksheet


Patient Problems: 


 Problems











Problem Status Onset


 


Cerebrovascular accident (CVA) involving left cerebral hemisphere Acute

## 2018-06-24 NOTE — SOAPPROG
SOAP Progress Note


Assessment/Plan: 


Assessment/Plan:


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 

hemiparesis in a left-hand-dominant man.  


* Initial functional independence measure 38 on 6/1/2018, improved to 45 as of 6 /8/2018, and to 53 as of 6/13/2018.  Remains at 53 on 6/20/2018.  Moderate 

assist for bed mobility.  Delayed balance reactions.  Difficulty with 

multitasking and maintaining balance.  Squat pivot transfer require moderate 

assistance.  Ambulated 60 ft with front wheeled walker, AFO and 2 assist.  

Upper body dressing requires minimal to moderate assistance, lower body 

dressing requires maximal assistance.  Poor motor planning.  Bath transfer 

required total assist and toileting required total assist of 2. He was able to 

bathe with moderate assistance.  He has recovered movement in all planes with 

the left upper extremity but not yet functional.


* Continue PT and OT to optimize mobility and activities of daily living to the 

standby assist or contact guard assist level.





Dysarthria and history of dementia.


* Working memory is very impaired.  Also with decreased attention, organization

, initiation and problem-solving.


* SLUMS improved from 11/30 to 20/30, consistent with mild neuro cognitive 

disorder.  No decline in cognition noted with discontinuation of donepezil.


* Continue Speech and Language Pathology.  





Secondary prevention of cerebrovascular accident/neuro recovery.  


* Continue aspirin, clopidogrel, blood pressure and lipid control.  


* Initiate fluoxetine for neuro recovery at 10 mg daily beginning 05/31/2018.  

Titrated to 20 mg p.o. daily starting 6/5/2018.





Hypertension.  Continue lisinopril 20 mg QD.  Blood pressure is running 

slightly high.  Increase amlodipine from 2.5 mg to 5 mg q.day starting 6/21/ 2018..





Diabetes mellitus type 2.  


* Increased metformin from 500 mg p.o. b.i.d to 850 mg BID starting 5/31/2018.  

Titrated further to 1000 mg twice daily starting 6/5/2018.


* Increase sitagliptin from 50 mg q.day to 100 mg q.day starting 6/16/2018.


* Continue insulin glargine 9 units at HS.  Continue insulin lispro sliding 

scale.  Goal is to titrate oral medications for no need of daytime insulin.


* Per hospital discharge information, his hemoglobin A1c was 8.5%.Given his age 

and comorbidities, a reasonable goal would be 8%.





Bowel and bladder incontinence.


* Continue condom catheter at night.


* Scheduled toileting.  Trial of psyllium fiber supplement q.day as stool 

bulking agent starting 6/18/2018..


* No urinary retention.  Discontinued donepezil starting 6/14/2018 without 

improvement in urinary frequency or decline in cognition.


* Continues with frequent urination and defecation as often as q.1 hour through 

the day.





Urinary frequency, premorbid.


* Would not initiate bladder anticholinergic due to cognitive effects.


* Consider trial of mirabegron,  but it is not available through the hospital 

formulary.





Weight loss in recent months.


* Related to influenza, shingles and secondary Staphylococcus infection.


* Consultation with dietitian.





Fatigue.


* Laboratory evaluation 6/15/2018 with normal renal and hepatic function and 

normal TSH.





Anemia.  New since admission to inpatient rehabilitation.  


* Not iron deficient, no macrocytosis.  Likely anemia due to chronic disease or 

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  Recheck p.r.n..  


* Advise follow-up with Hematology after discharge.





Dyslipidemia.  Continue atorvastatin.





History of cardiac dysrhythmia, unclear what the rhythm abnormality was.  He is 

on sotalol, which also contributes to blood pressure control.





Risk for QT prolongation with concurrent donepezil and sotalol.  Reviewed EKG 

from [facility].  No QT prolongation.  Donepezil has 

subsequently been discontinued.





Possible peripheral vascular disease with cyanotic and cold feet.  Secondary 

prevention for cerebrovascular accident and coronary artery disease will also 

serve to prevent worsening of circulation to his lower extremities.





Cough,  due to seasonal allergies.  Continue cetirizine.





Chronic leukocytosis.  He had no signs or symptoms of infection in the 

hospital.  The differential was primary lymphocytes, and the hospital 

physicians queried whether he has an underlying leukemia, and per hospital 

records, the wife reported that he had a long history of leukocytosis, had seen 

oncologist in [place], and no further evaluation or treatment was advised 

at that time.





Spinal stenosis.  He has been placed on spinal precautions.  It is unclear if 

these are truly necessary, but it is worthwhile to prevent any complications 

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 

central canal stenosis.


* Back pain improved with initiation of low-dose gabapentin.





Prophylaxis.  He is at elevated risk for DVT given his age and hemiparesis.  

Will initiate enoxaparin 40 mg subcutaneous daily and will provide sequential 

compression devices to his legs at night.





DISPOSITION: Continues to need 24 hr care and ambulation is not functional.  

Son prefers to take him home though there are multiple steps to enter and 

within the house.  Would need considerable home modifications.  SNF remains an 

alternative.  Family to visit several SNFs.  Continue discharge date of 6/29/ 2018.





FOLLOWUP:  He had followup advised in 2 weeks with the neurologist in [place], Dr. Bhatia, though it seems likely that he will seek followup with a 

neurologist in [place] where his son lives.





PRIMARY CARE PROVIDER:  Zara P. Frankel, MD, in [place].








Doing well today - BP has been well maintained over the last 24hrs - will not 

increase his amlodipine and continue to monitor. Had good discussion with 

patient about celebrating the victories rather than focusing on the "disability

".  No new neurologic changes. 





06/24/18 11:41





Subjective: 





Feels pretty good - no fevers/chills.  No new weakness/sensory changes.  Having 

good BM's - overall appetite is a little down but he continues to tolerate PO 

well.   Anxious to make better improvement than he has up  to this point.


Objective: 





 Vital Signs











Temp Pulse Resp BP Pulse Ox


 


 97.7 F   62   14   118/58 L  94 


 


 06/24/18 06:36  06/24/18 08:36  06/24/18 06:36  06/24/18 08:36  06/24/18 08:36








 Laboratory Results





 06/15/18 06:00 





 06/15/18 06:00 





 











 06/23/18 06/24/18 06/25/18





 05:59 05:59 05:59


 


Intake Total 1020 500 360


 


Output Total 1675 1600 


 


Balance -655 -1100 360














Physical Exam





- Physical Exam


General Appearance: alert, no apparent distress


EENT: other (MMM)


Respiratory: lungs clear, normal breath sounds


Cardiac/Chest: regular rate, rhythm


Abdomen: normal bowel sounds, non-tender, soft


Skin: normal color


Extremities: other (no LE edema)


Neuro/Psych: normal mood/affect





ICD10 Worksheet


Patient Problems: 


 Problems











Problem Status Onset


 


Cerebrovascular accident (CVA) involving left cerebral hemisphere Acute

## 2018-06-25 NOTE — SOAPPROG
SOAP Progress Note


Assessment/Plan: 


Assessment:





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 

hemiparesis in a left-hand-dominant man.  


* Initial functional independence measure 38 on 6/1/2018, improved to 45 as of 6 /8/2018, and to 53 as of 6/13/2018.  Remains at 53 on 6/20/2018.  Moderate 

assist for bed mobility.  Delayed balance reactions.  Difficulty with 

multitasking and maintaining balance.  Squat pivot transfer require moderate 

assistance.  Ambulated 60 ft with front wheeled walker, AFO and 2 assist.  

Upper body dressing requires minimal to moderate assistance, lower body 

dressing requires maximal assistance.  Poor motor planning.  Bath transfer 

required total assist and toileting required total assist of 2. He was able to 

bathe with moderate assistance.  He has recovered movement in all planes with 

the left upper extremity but not yet functional.


* As of 6/25/2018, ambulation improving to 1 person assist.


* Continue PT and OT to optimize mobility and activities of daily living to the 

standby assist or contact guard assist level.





Dysarthria and history of dementia.


* Working memory is very impaired.  Also with decreased attention, organization

, initiation and problem-solving.


* SLUMS improved from 11/30 to 20/30, consistent with mild neuro cognitive 

disorder.  No decline in cognition noted with discontinuation of donepezil.


* Continue Speech and Language Pathology.  





Secondary prevention of cerebrovascular accident/neuro recovery.  


* Continue aspirin, clopidogrel, blood pressure and lipid control.  


* Initiate fluoxetine for neuro recovery at 10 mg daily beginning 05/31/2018.  

Titrated to 20 mg p.o. daily starting 6/5/2018.





Hypertension.  Continue lisinopril 20 mg QD.  Blood pressure is running 

slightly high.  Increased amlodipine from 2.5 mg to 5 mg q.day starting 6/21/ 2018..





Diabetes mellitus type 2.  


* Increased metformin from 500 mg p.o. b.i.d to 850 mg BID starting 5/31/2018.  

Titrated further to 1000 mg twice daily starting 6/5/2018.


* Increase sitagliptin from 50 mg q.day to 100 mg q.day starting 6/16/2018.


* Continue insulin glargine 9 units at HS.  Continue insulin lispro sliding 

scale.  Goal is to titrate oral medications for no need of daytime insulin.


* Per hospital discharge information, his hemoglobin A1c was 8.5%.Given his age 

and comorbidities, a reasonable goal would be 8%.





Bowel and bladder incontinence.


* Continue condom catheter at night.


* Scheduled toileting.  Trial of psyllium fiber supplement q.day as stool 

bulking agent starting 6/18/2018..


* No urinary retention.  Discontinued donepezil starting 6/14/2018 without 

improvement in urinary frequency or decline in cognition.


* Continues with frequent urination and defecation as often as q.1 hour through 

the day.





Urinary frequency, premorbid.


* Would not initiate bladder anticholinergic due to cognitive effects.


* Consider trial of mirabegron,  but it is not available through the hospital 

formulary.





Weight loss in recent months.


* Related to influenza, shingles and secondary Staphylococcus infection.


* Consultation with dietitian.





Fatigue.


* Laboratory evaluation 6/15/2018 with normal renal and hepatic function and 

normal TSH.





Anemia.  New since admission to inpatient rehabilitation.  


* Not iron deficient, no macrocytosis.  Likely anemia due to chronic disease or 

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  Recheck p.r.n..  


* Advise follow-up with Hematology after discharge.





Dyslipidemia.  Continue atorvastatin.





History of cardiac dysrhythmia, unclear what the rhythm abnormality was.  He is 

on sotalol, which also contributes to blood pressure control.





Risk for QT prolongation with concurrent donepezil and sotalol.  Reviewed EKG 

from [facility], [place], operated by [facility].  No QT prolongation.  Donepezil has 

subsequently been discontinued.





Possible peripheral vascular disease with cyanotic and cold feet.  Secondary 

prevention for cerebrovascular accident and coronary artery disease will also 

serve to prevent worsening of circulation to his lower extremities.





Cough,  due to seasonal allergies.  Continue cetirizine.





Chronic leukocytosis.  He had no signs or symptoms of infection in the 

hospital.  The differential was primary lymphocytes, and the hospital 

physicians queried whether he has an underlying leukemia, and per hospital 

records, the wife reported that he had a long history of leukocytosis, had seen 

oncologist in [place], and no further evaluation or treatment was advised 

at that time.





Spinal stenosis.  He has been placed on spinal precautions.  It is unclear if 

these are truly necessary, but it is worthwhile to prevent any complications 

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 

central canal stenosis.


* Back pain improved with initiation of low-dose gabapentin.





Prophylaxis.  He is at elevated risk for DVT given his age and hemiparesis.  

Will initiate enoxaparin 40 mg subcutaneous daily and will provide sequential 

compression devices to his legs at night.





DISPOSITION: Continues to need 24 hr care and ambulation is not functional.  

Son prefers to take him home though there are multiple steps to enter and 

within the house.  Would need considerable home modifications.  SNF remains an 

alternative.  Family is visiting several SNFs.  Continue discharge date of 6/29/ 2018.





FOLLOWUP:  He had followup advised in 2 weeks with the neurologist in [place], Dr. Bhatia, though it seems likely that he will seek followup with a 

neurologist in [place] where his son lives.





PRIMARY CARE PROVIDER:  Zara P. Frankel, MD, in [place].








06/25/18 11:03





Subjective: 





No complaints.  Therapy notes indicate improved use of left upper extremity.  

He continues to report frustration and his slow pace of improvement.


Objective: 





 Vital Signs











Temp Pulse Resp BP Pulse Ox


 


 36.5 C   64   15   100/52 L  95 


 


 06/25/18 06:10  06/25/18 09:02  06/25/18 06:10  06/25/18 09:03  06/25/18 06:10








 Laboratory Results





 06/15/18 06:00 





 06/15/18 06:00 





 











 06/24/18 06/25/18 06/26/18





 05:59 05:59 05:59


 


Intake Total 500 1010 600


 


Output Total 1600 1400 


 


Balance -1100 -390 600














Physical Exam





- Physical Exam


General Appearance: WD/WN, alert, no apparent distress


Respiratory: No respiratory distress, No accessory muscle use


Skin: normal color, warm/dry


Neuro/Psych: alert, normal mood/affect, oriented x 3, motor weakness (Left 

upper and lower extremity.  Improved volitional movement of the left upper 

extremity including finger flexion and finger extension.)





ICD10 Worksheet


Patient Problems: 


 Problems











Problem Status Onset


 


Cerebrovascular accident (CVA) involving left cerebral hemisphere Acute  














- ICD10 Problem Qualifiers


(1) Cerebrovascular accident (CVA) involving left cerebral hemisphere

## 2018-06-26 NOTE — SOAPPROG
SOAP Progress Note


Assessment/Plan: 


Assessment:





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 

hemiparesis in a left-hand-dominant man.  


* Initial functional independence measure 38 on 6/1/2018, improved to 45 as of 6 /8/2018, and to 53 as of 6/13/2018.  Remains at 53 on 6/20/2018.  Moderate 

assist for bed mobility.  Delayed balance reactions.  Difficulty with 

multitasking and maintaining balance.  Squat pivot transfer require moderate 

assistance.  Ambulated 60 ft with front wheeled walker, AFO and 2 assist.  

Upper body dressing requires minimal to moderate assistance, lower body 

dressing requires maximal assistance.  Poor motor planning.  Bath transfer 

required total assist and toileting required total assist of 2. He was able to 

bathe with moderate assistance.  He has recovered movement in all planes with 

the left upper extremity but not yet functional.


* As of 6/25/2018, ambulation improving to 1 person assist.


* Continue PT and OT to optimize mobility and activities of daily living to the 

standby assist or contact guard assist level.





Dysarthria and history of dementia.


* Working memory is very impaired.  Also with decreased attention, organization

, initiation and problem-solving.


* SLUMS improved from 11/30 to 20/30, consistent with mild neuro cognitive 

disorder.  


* Restart donepezil 5 mg at bedtime on 6/26/2018.


* Continue Speech and Language Pathology.  





Secondary prevention of cerebrovascular accident/neuro recovery.  


* Continue aspirin, clopidogrel, blood pressure and lipid control.  


* Initiate fluoxetine for neuro recovery at 10 mg daily beginning 05/31/2018.  

Titrated to 20 mg p.o. daily starting 6/5/2018.





Hypertension.  Has had low blood pressures on lisinopril 20 mg q.day plus 

amlodipine 5 mg q.day but high blood pressure when amlodipine was at 2.5 mg 

q.day.  


* Discontinue amlodipine starting 6/25/2018.  


* Increase lisinopril to 30 mg daily starting 6/26/2018.





Diabetes mellitus type 2.  


* Increased metformin from 500 mg p.o. b.i.d to 850 mg BID starting 5/31/2018.  

Titrated further to 1000 mg twice daily starting 6/5/2018.


* Increase sitagliptin from 50 mg q.day to 100 mg q.day starting 6/16/2018.


* Continue insulin glargine 9 units at HS.  Continue insulin lispro sliding 

scale.  Goal is to titrate oral medications for no need of daytime insulin.


* Per hospital discharge information, his hemoglobin A1c was 8.5%.Given his age 

and comorbidities, a reasonable goal would be 8%.





Bowel and bladder incontinence.


* Continue condom catheter at night.


* Scheduled toileting.  Trial of psyllium fiber supplement q.day as stool 

bulking agent starting 6/18/2018..


* No urinary retention.  Discontinued donepezil starting 6/14/2018 without 

improvement in urinary frequency.


* Continues with frequent urination and defecation as often as q.1 hour through 

the day.





Urinary frequency, premorbid.


* Would not initiate bladder anticholinergic due to cognitive effects.


* Consider trial of mirabegron,  but it is not available through the hospital 

formulary.





Weight loss in recent months.


* Related to influenza, shingles and secondary Staphylococcus infection.


* Consultation with dietitian.





Fatigue.


* Laboratory evaluation 6/15/2018 with normal renal and hepatic function and 

normal TSH.





Anemia.  New since admission to inpatient rehabilitation.  


* Not iron deficient, no macrocytosis.  Likely anemia due to chronic disease or 

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  Recheck p.r.n..  


* Advise follow-up with Hematology after discharge.





Dyslipidemia.  Continue atorvastatin.





History of cardiac dysrhythmia, unclear what the rhythm abnormality was.  He is 

on sotalol, which also contributes to blood pressure control.





Risk for QT prolongation with concurrent donepezil and sotalol.  Reviewed EKG 

from [facility].  No QT prolongation.  





Possible peripheral vascular disease with cyanotic and cold feet.  Secondary 

prevention for cerebrovascular accident and coronary artery disease will also 

serve to prevent worsening of circulation to his lower extremities.





Cough,  due to seasonal allergies.  Has been taking cetirizine for 2 weeks.  

Trial of discontinuation starting 6/27/2018.  Restart if he has recurrence of 

symptoms.





Chronic leukocytosis.  He had no signs or symptoms of infection in the 

hospital.  The differential was primary lymphocytes, and the hospital 

physicians queried whether he has an underlying leukemia, and per hospital 

records, the wife reported that he had a long history of leukocytosis, had seen 

oncologist in [place], and no further evaluation or treatment was advised 

at that time.





Spinal stenosis.  He has been placed on spinal precautions.  It is unclear if 

these are truly necessary, but it is worthwhile to prevent any complications 

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 

central canal stenosis.


* Back pain improved with initiation of low-dose gabapentin.





Prophylaxis.  He is at elevated risk for DVT given his age and hemiparesis.  

Will initiate enoxaparin 40 mg subcutaneous daily and will provide sequential 

compression devices to his legs at night.





DISPOSITION: Continues to need 24 hr care and ambulation is not functional.  

Son prefers to take him home though there are multiple steps to enter and 

within the house.  Would need considerable home modifications.  SNF remains an 

alternative.  Family is visiting several SNFs.  Continue discharge date of 6/29/ 2018.





FOLLOWUP:  He had followup advised in 2 weeks with the neurologist in [place], Dr. Bhatia, though it seems likely that he will seek followup with a 

neurologist in [place] where his son lives.





PRIMARY CARE PROVIDER:  Zara P. Frankel, MD, in [place].





Condition and plan discussed in detail with patient's daughter Milana, 6/26/ 2018.





06/26/18 14:05





Subjective: 





Frustrated by difficulty with crossword puzzle today with SLP.  Says he did 

well in the 1st part of his session, testing recall.  Otherwise without 

complaints.  Not in pain.  Good appetite.  Bowels are moving.  No fevers or 

chills.


Objective: 





 Vital Signs











Temp Pulse Resp BP Pulse Ox


 


 36.4 C   61   16   126/60 H  92 


 


 06/26/18 07:55  06/26/18 08:00  06/26/18 07:55  06/26/18 08:00  06/26/18 07:55








 Laboratory Results





 06/15/18 06:00 





 06/15/18 06:00 





 











 06/25/18 06/26/18 06/27/18





 05:59 05:59 05:59


 


Intake Total 1010 1020 600


 


Output Total 1400 1126 300


 


Balance -390 -106 300














Physical Exam





- Physical Exam


General Appearance: WD/WN, alert, no apparent distress


Respiratory: No respiratory distress, No accessory muscle use


Skin: normal color, warm/dry


Neuro/Psych: alert, normal mood/affect, oriented x 3, motor weakness (Moves 

left upper extremity in all planes.)





ICD10 Worksheet


Patient Problems: 


 Problems











Problem Status Onset


 


Cerebrovascular accident (CVA) involving left cerebral hemisphere Acute  














- ICD10 Problem Qualifiers


(1) Cerebrovascular accident (CVA) involving left cerebral hemisphere

## 2018-06-27 NOTE — SOAPPROG
SOAP Progress Note


Assessment/Plan: 


Assessment:





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 

hemiparesis in a left-hand-dominant man.  


* Initial functional independence measure 38 on 6/1/2018, improved to 45 as of 6 /8/2018, and to 53 as of 6/13/2018.  Remains at 53 on 6/20/2018.  Improved to 

58 as of 6/27/2018.  Moderate assistance for bed mobility, transfers, and sit 

to stand.  Ambulated 60 ft with moderate assistance and 2nd person following 

with wheelchair.  Upper body dressing required minimal to moderate assistance, 

lower body dressing required mom moderate assistance with cues but maximal 

assistance for his left shoe and AFO.  Shower transfer still accomplished with 

shower wheelchair.  Toileting requires maximal assist.  Left upper extremity 

movement is improving but he is apraxic.


* As of 6/25/2018, ambulation improving to 1 person assist.


* Continue PT and OT to optimize mobility and activities of daily living to the 

standby assist or contact guard assist level.





Dysarthria and history of dementia.


* Working memory is very impaired.  Also with decreased attention, organization

, initiation and problem-solving.


* SLUMS improved from 11/30 to 20/30, consistent with mild neuro cognitive 

disorder.  


* Restart donepezil 5 mg at bedtime on 6/26/2018.


* Continue Speech and Language Pathology.  





Secondary prevention of cerebrovascular accident/neuro recovery.  


* Continue aspirin, clopidogrel, blood pressure and lipid control.  


* Initiate fluoxetine for neuro recovery at 10 mg daily beginning 05/31/2018.  

Titrated to 20 mg p.o. daily starting 6/5/2018.





Hypertension.  Has had low blood pressures on lisinopril 20 mg q.day plus 

amlodipine 5 mg q.day but high blood pressure when amlodipine was at 2.5 mg 

q.day.  


* Discontinue amlodipine starting 6/25/2018.  


* Increased lisinopril to 30 mg daily starting 6/26/2018.


* Blood pressure is controlled.





Diabetes mellitus type 2.  


* Increased metformin from 500 mg p.o. b.i.d to 850 mg BID starting 5/31/2018.  

Titrated further to 1000 mg twice daily starting 6/5/2018.


* Increase sitagliptin from 50 mg q.day to 100 mg q.day starting 6/16/2018.


* Continue insulin glargine 9 units at HS.  Continue insulin lispro sliding 

scale.  Goal is to titrate oral medications for no need of daytime insulin.


* Per hospital discharge information, his hemoglobin A1c was 8.5%.Given his age 

and comorbidities, a reasonable goal would be 8%.





Bowel and bladder incontinence.


* Continue condom catheter at night.


* Scheduled toileting.  Trial of psyllium fiber supplement q.day as stool 

bulking agent starting 6/18/2018.  Fecal incontinence has resolved.


* No urinary retention.  Discontinued donepezil starting 6/14/2018 without 

improvement in urinary frequency.


* Continues with frequent urination as often as q.1 hour through the day.





Urinary frequency, premorbid.


* Would not initiate bladder anticholinergic due to cognitive effects.


* Consider trial of mirabegron,  but it is not available through the hospital 

formulary.





Weight loss in recent months.


* Related to influenza, shingles and secondary Staphylococcus infection.


* Consultation with dietitian.





Fatigue.


* Laboratory evaluation 6/15/2018 with normal renal and hepatic function and 

normal TSH.





Anemia.  New since admission to inpatient rehabilitation.  


* Not iron deficient, no macrocytosis.  Likely anemia due to chronic disease or 

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  Recheck p.r.n..  


* Advise follow-up with Hematology after discharge.





Dyslipidemia.  Continue atorvastatin.





History of cardiac dysrhythmia, unclear what the rhythm abnormality was.  He is 

on sotalol, which also contributes to blood pressure control.





Risk for QT prolongation with concurrent donepezil and sotalol.  Reviewed EKG 

from [facility].  No QT prolongation.  





Possible peripheral vascular disease with cyanotic and cold feet.  Secondary 

prevention for cerebrovascular accident and coronary artery disease will also 

serve to prevent worsening of circulation to his lower extremities.





Cough,  due to seasonal allergies.  Has been taking cetirizine for 2 weeks.  

Trial of discontinuation starting 6/27/2018.  Restart if he has recurrence of 

symptoms.





Chronic leukocytosis.  He had no signs or symptoms of infection in the 

hospital.  The differential was primary lymphocytes, and the hospital 

physicians queried whether he has an underlying leukemia, and per hospital 

records, the wife reported that he had a long history of leukocytosis, had seen 

oncologist in [place], and no further evaluation or treatment was advised 

at that time.





Spinal stenosis.  He has been placed on spinal precautions.  It is unclear if 

these are truly necessary, but it is worthwhile to prevent any complications 

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 

central canal stenosis.


* Back pain improved with initiation of low-dose gabapentin.





Prophylaxis.  He is at elevated risk for DVT given his age and hemiparesis.  

Will initiate enoxaparin 40 mg subcutaneous daily and will provide sequential 

compression devices to his legs at night.





DISPOSITION:  Attended staffing, 15 min.  Discussed with case management, 

dietitian, nursing, PT, OT, SLP.  Continues to need 24 hr care and ambulation 

is not functional.  Family is visiting several SNFs.  Continue discharge date 

of 6/29/2018.





FOLLOWUP:  He had followup advised in 2 weeks with the neurologist in [place], Dr. Bhatia, though it seems likely that he will seek followup with a 

neurologist in [place] where his son lives.





PRIMARY CARE PROVIDER:  Zara P. Frankel, MD, in [place].





06/27/18 12:37





Subjective: 





No complaints today.  Sleeping well.  Not in pain.  No cough or dyspnea, no 

fevers or chills.


Objective: 





 Vital Signs











Temp Pulse Resp BP Pulse Ox


 


 37.1 C   62   18   122/58 H  95 


 


 06/27/18 07:36  06/27/18 08:04  06/27/18 07:36  06/27/18 08:04  06/27/18 07:36








 Laboratory Results





 06/15/18 06:00 





 06/15/18 06:00 





 











 06/26/18 06/27/18 06/28/18





 05:59 05:59 05:59


 


Intake Total 1020 836 650


 


Output Total 1126 6369 250


 


Balance -106 -664 400














- Time Spent With Patient


Time Spent With Patient: 





Greater than 35 min floor time today, including more than 50% of time in 

coordination of care during staffing meeting, and counseling patient.





Physical Exam





- Physical Exam


General Appearance: WD/WN, alert, no apparent distress


Respiratory: normal breath sounds, No crackles, No rhonchi, No wheezing


Cardiac/Chest: regular rate, rhythm, systolic murmur, No edema


Skin: normal color, warm/dry


Neuro/Psych: alert, normal mood/affect, oriented x 3, motor weakness (Left 

upper and lower extremities.  Improvement in movement of the left upper 

extremity.)





ICD10 Worksheet


Patient Problems: 


 Problems











Problem Status Onset


 


Cerebrovascular accident (CVA) involving left cerebral hemisphere Acute  














- ICD10 Problem Qualifiers


(1) Cerebrovascular accident (CVA) involving left cerebral hemisphere

## 2018-06-28 NOTE — SOAPPROG
SOAP Progress Note


Assessment/Plan: 


Assessment:





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 

hemiparesis in a left-hand-dominant man.  


* Initial functional independence measure 38 on 6/1/2018, improved to 45 as of 6 /8/2018, and to 53 as of 6/13/2018.  Remains at 53 on 6/20/2018.  Improved to 

58 as of 6/27/2018.  Moderate assistance for bed mobility, transfers, and sit 

to stand.  Ambulated 60 ft with moderate assistance and 2nd person following 

with wheelchair.  Upper body dressing required minimal to moderate assistance, 

lower body dressing required mom moderate assistance with cues but maximal 

assistance for his left shoe and AFO.  Shower transfer still accomplished with 

shower wheelchair.  Toileting requires maximal assist.  Left upper extremity 

movement is improving but he is apraxic.


* As of 6/25/2018, ambulation improving to 1 person assist.


* Continue PT and OT to optimize mobility and activities of daily living to the 

standby assist or contact guard assist level.





Dysarthria and history of dementia.


* Working memory is very impaired.  Also with decreased attention, organization

, initiation and problem-solving.


* SLUMS improved from 11/30 to 20/30, consistent with mild neuro cognitive 

disorder.  


* Restart donepezil 5 mg at bedtime on 6/26/2018.


* Continue Speech and Language Pathology.  





Secondary prevention of cerebrovascular accident/neuro recovery.  


* Continue aspirin, clopidogrel, blood pressure and lipid control.  


* Initiate fluoxetine for neuro recovery at 10 mg daily beginning 05/31/2018.  

Titrated to 20 mg p.o. daily starting 6/5/2018.





Hypertension.  Has had low blood pressures on lisinopril 20 mg q.day plus 

amlodipine 5 mg q.day but high blood pressure when amlodipine was at 2.5 mg 

q.day.  


* Discontinue amlodipine starting 6/25/2018.  


* Increased lisinopril to 30 mg daily starting 6/26/2018.


* Blood pressure is controlled.





Diabetes mellitus type 2.  


* Increased metformin from 500 mg p.o. b.i.d to 850 mg BID starting 5/31/2018.  

Titrated further to 1000 mg twice daily starting 6/5/2018.


* Increase sitagliptin from 50 mg q.day to 100 mg q.day starting 6/16/2018.


* Continue insulin glargine 9 units at HS.  Continue insulin lispro sliding 

scale.  Goal is to titrate oral medications for no need of daytime insulin.


* Per hospital discharge information, his hemoglobin A1c was 8.5%.Given his age 

and comorbidities, a reasonable goal would be 8%.





Bowel and bladder incontinence.


* Continue condom catheter at night.


* Scheduled toileting.  Trial of psyllium fiber supplement q.day as stool 

bulking agent starting 6/18/2018.  Fecal incontinence has resolved.


* No urinary retention.  Discontinued donepezil starting 6/14/2018 without 

improvement in urinary frequency.


* Continues with frequent urination as often as q.1 hour through the day.





Urinary frequency, premorbid.


* Would not initiate bladder anticholinergic due to cognitive effects.


* Consider trial of mirabegron,  but it is not available through the hospital 

formulary.





Weight loss in recent months.


* Related to influenza, shingles and secondary Staphylococcus infection.


* Consultation with dietitian.





Fatigue.


* Laboratory evaluation 6/15/2018 with normal renal and hepatic function and 

normal TSH.





Anemia.  New since admission to inpatient rehabilitation.  


* Not iron deficient, no macrocytosis.  Likely anemia due to chronic disease or 

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  Recheck p.r.n..  


* Advise follow-up with Hematology after discharge.





Dyslipidemia.  Continue atorvastatin.





History of cardiac dysrhythmia, unclear what the rhythm abnormality was.  He is 

on sotalol, which also contributes to blood pressure control.





Risk for QT prolongation with concurrent donepezil and sotalol.  Reviewed EKG 

from [facility].  No QT prolongation.  





Possible peripheral vascular disease with cyanotic and cold feet.  Secondary 

prevention for cerebrovascular accident and coronary artery disease will also 

serve to prevent worsening of circulation to his lower extremities.





Cough,  due to seasonal allergies.  Has been taking cetirizine for 2 weeks.  

Trial of discontinuation starting 6/27/2018.  Restart if he has recurrence of 

symptoms.





Chronic leukocytosis.  He had no signs or symptoms of infection in the 

hospital.  The differential was primary lymphocytes, and the hospital 

physicians queried whether he has an underlying leukemia, and per hospital 

records, the wife reported that he had a long history of leukocytosis, had seen 

oncologist in [place], and no further evaluation or treatment was advised 

at that time.





Spinal stenosis.  He has been placed on spinal precautions.  It is unclear if 

these are truly necessary, but it is worthwhile to prevent any complications 

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 

central canal stenosis.


* Back pain improved with initiation of low-dose gabapentin.





Prophylaxis.  He is at elevated risk for DVT given his age and hemiparesis.  

Will initiate enoxaparin 40 mg subcutaneous daily and will provide sequential 

compression devices to his legs at night.





DISPOSITION:  Continues to need 24 hr care and ambulation is not functional.  

Discharge to SNF tomorrow, 6/29/2018.





FOLLOWUP:  He had followup advised in 2 weeks with the neurologist in [place], Dr. Bhatia, though it seems likely that he will seek followup with a 

neurologist in [place] where his son lives.





PRIMARY CARE PROVIDER:  Zara P. Frankel, MD, in [place].





06/28/18 14:32





Subjective: 





No complaints.  Thinks he has had a reduced appetite for few days but it is 

improving.  Not in pain.  No cough or dyspnea.  No fevers or chills.  Sleeping 

well.


Objective: 





 Vital Signs











Temp Pulse Resp BP Pulse Ox


 


 36.4 C   64   16   105/60   94 


 


 06/28/18 08:00  06/28/18 08:26  06/28/18 08:00  06/28/18 08:26  06/28/18 08:00








 Laboratory Results





 06/15/18 06:00 





 06/15/18 06:00 





 











 06/27/18 06/28/18 06/29/18





 05:59 05:59 05:59


 


Intake Total 836 1130 840


 


Output Total 1500 920 450


 


Balance -664 210 390














Physical Exam





- Physical Exam


General Appearance: WD/WN, alert, no apparent distress


Respiratory: normal breath sounds, No crackles, No rhonchi, No wheezing


Cardiac/Chest: regular rate, rhythm, systolic murmur, No edema


Skin: normal color, warm/dry, cyanosis


Neuro/Psych: alert, normal mood/affect, oriented x 3, motor weakness (LUE & LLE)





ICD10 Worksheet


Patient Problems: 


 Problems











Problem Status Onset


 


Cerebrovascular accident (CVA) involving left cerebral hemisphere Acute  














- ICD10 Problem Qualifiers


(1) Cerebrovascular accident (CVA) involving left cerebral hemisphere

## 2018-06-28 NOTE — GDS
[f 
rep st]



                                                             DISCHARGE SUMMARY





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 
with left-sided weakness.



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 
with left-sided weakness.



OTHER DISCHARGE DIAGNOSES:  

1.  Hypertension.

2.  Diabetes mellitus type 2.

3.  Urinary frequency.



COMPLICATIONS:  None.



CONSULTATIONS:  Acupuncture service.



PROCEDURES:  None.



HISTORY AND HOSPITAL COURSE:  This patient was admitted from [facility], [place], operated by [facility] where he had presented with left-sided weakness and slurred 
speech on 05/25/2018.  MRI showed an acute right posterior internal capsule 
CVA.  There were no embolic sources seen on echocardiogram, though he had mild 
to moderate concentric LVH and grade 1 diastolic dysfunction.  Additionally, 
there was mild aortic stenosis with a mean aortic gradient of 15 mmHg.  There 
were no dysrhythmias on tele monitoring.  He had already been taking lisinopril
, aspirin, and atorvastatin.  Clopidogrel was added.  He was otherwise 
medically stable and appropriate for inpatient rehabilitation.  



He had gradual progress in rehabilitation.  His initial functional independence 
measure was 38 on 06/01/2018, indicating need for assistance at the nursing 
home level with all aspects of mobility and activities of daily living.  He 
eventually improved to a functional independence measure of 58 on 06/27/2018.  
This is still consistent with nursing home level of care, but he had 
considerable improvement.  He required moderate assistance for bed mobility, 
transfers, and for sit to stand.  He was able to ambulate 60 feet with moderate 
assistance with a second person following with a wheelchair.  He could dress 
his upper body with minimal to moderate assistance.  He required moderate 
assistance with cues for lower body dressing but maximal assistance for his 
left shoe and ankle-foot orthosis.  He had poor midline awareness and tended to 
push to the left.  He was not safe for shower transfer, and shower transfer was 
accomplished with a shower wheelchair.  He required maximal assistance for 
toileting.  He had considerable improvement in his use of his left upper 
extremity and was beginning to be able to incorporate it into activities of 
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 



Speech and Language Pathology evaluated him regarding dysarthria.  He also had 
a history of dementia.  He was found to have a very impaired working memory and 
decreased attention, organization, initiation, and problem solving.  The [facility] Mental Status Exam was administered, and initially, he scored 
11/30 which is in the demented range.  Subsequently, he improved to 20/30 which 
is consistent with mild neurocognitive disorder.  He had been treated with 
donepezil prior to his stroke.  It was discontinued during his stay due to 
urinary frequency and incontinence.  However, there was no change in his 
urination issues, so it was restarted several days before his discharge, at 5 
mg at bedtime. 



He was continued on aspirin, clopidogrel, as well as blood pressure and lipid 
control as secondary prevention of CVA.  He was treated with fluoxetine for 
neurologic recovery as well and showed no complications of these medications. 



Regarding his blood pressure, he was running somewhat high on lisinopril at 20 
mg per day.  Amlodipine was added at 2.5 mg per day.  He continued to have 
elevated blood pressures, so amlodipine was titrated to 5 mg daily.  On this 
dose, he had low blood pressures.  Ultimately, the amlodipine was discontinued, 
and he was treated with lisinopril at 30 mg per day with adequate blood 
pressure control. 



Regarding diabetes mellitus, his metformin was increased from 500 mg twice 
daily to 1000 mg twice daily over several days.  Additionally, he was begun on 
sitagliptin which was titrated from 50 mg per day to 100 mg per day.  On this 
regimen, as well as insulin glargine 9 units at bedtime, he did not need a 
sliding scale of insulin.  At [facility], hemoglobin A1c was measured 
at 8.5%.  Given age and comorbidities, a reasonable goal for him would be 8%, 
and it should be checked after approximately 3 months or some time in August 
for recheck. 



He had bowel and bladder incontinence, as well as urinary frequency.  These did 
not improve with discontinuation of donepezil, and so donepezil was restarted.  
He used a condom catheter at night which allowed him to sleep better.  Bowel 
incontinence improved, especially after addition of psyllium fiber supplement, 
and ultimately, fecal incontinence resolved.  There was no urinary retention.  
There was consideration of initiation of a bladder anticholinergic, but due to 
concerns regarding cognition, this was not tried.  It would be reasonable to 
try mirabegron for bladder hyperactivity.  This was not on hospital formulary, 
so it was not tried. 



He had anemia.  He did not have iron deficiency and he was not microcytic.  
This was considered to be due to anemia of chronic disease versus being related 
to chronic leukocytosis.  Chronic leukocytosis preceded his initial 
hospitalization, and he should follow up with Hematology eventually after 
discharge to determine whether or not he has a bone marrow syndrome. 



There was a history of cardiac dysrhythmia for which he had been treated with 
sotalol and the sotalol was continued.  EKG from [facility], [place], operated by [facility] 
was reviewed out of concern for QT prolongation with concurrent donepezil and 
sotalol, and there was no QT prolongation on that the EKG. 



He had a history of spinal stenosis.  He had back pain which developed during 
his stay, and this responded to low-dose gabapentin. 



2 days before his discharge nursing noted a blister on his left buttock.  The 
blister deroofed and revealed a stage II ulcer approximately 2 cm and diameter.
  He was treated with Allevyn dressing.  It is important for him to regularly 
on weight the left buttock and to avoid shear.



He was maintained on DVT prophylaxis, but as of the day of discharge, he is 5 
weeks out from his cerebrovascular accident and has somewhat improved mobility.
  He likely does not need DVT prophylaxis anymore, and he is not being 
discharged on enoxaparin.



DISCHARGE CONDITION:  Good.



DIET:  Regular, regular texture with thin liquids, no concentrated carbohydrates
, consistent carbohydrates, 2200 kilocalories per day.



ACTIVITY:  Ad lin but he needs assistance for all mobility-related activities 
of daily living, as well as dressing, toileting, and showering.



MEDICATIONS ON DISCHARGE:  

1.  Acetaminophen 650 mg p.o. q.4 hours p.r.n.

2.  Aspirin 81 mg p.o. daily.

3.  Atorvastatin 40 mg p.o. daily.

4.  Clopidogrel 75 mg p.o. daily.

5.  Docusate 100 mg p.o. every other day.

6.  Donepezil 5 mg p.o. at bedtime.

7.  Fluoxetine 20 mg p.o. daily.

8.  Fluticasone 2 sprays each naris daily.

9.  Gabapentin 100 mg p.o. three times daily.

10.  Insulin glargine 9 units subcutaneous at bedtime.

11.  Lisinopril 30 mg p.o. daily.

12.  Metformin 1000 mg p.o. twice daily.

13.  Nystatin powder to perineum three times daily.

14.  Polyethylene glycol 17 g p.o. daily p.r.n.

15.  Metamucil daily.

16.  Sitagliptin 100 mg p.o. daily.

17.  Sotalol 80 mg p.o. twice daily.

18.  Tamsulosin 0.4 mg p.o. daily.



ISSUES TO BE ADDRESSED AT FOLLOWUP:  

1.  Mobility and activities of daily living.  He should continue PT and OT at 
his [facility], and he can follow up with his new attending who 
will be assigned there.

2.  Cognitive issues.  Continue SLP at his [facility] and follow 
up with his new attending.

3.  Chronic conditions of diabetes, hypertension, dyslipidemia, and secondary 
stroke prophylaxis with aspirin and clopidogrel.  These issues have been 
stable.  He can follow up again with his attending physician at the [facility].

4.  Chronic leukocytosis.  It would be in his best interest to have an 
evaluation by a hematologist who could also address his anemia.



Greater than 30 min were spent on this discharge including medication 
reconciliation, coordination of care, and counseling patient and family.



Copy requested to:

[facility]



Job #:  [id number]

MTDD

## 2018-06-29 NOTE — PDOREHIP
Admission [facility]





- Admission - 3 Day Assessment Period


Admission Date/Day 1: 05/30/18


Day 2: 05/31/18


Day 3: 06/01/18





Discharge [facility]





- Discharge  - 3 Day Assessment Period


2 Days Prior to Anticipated Discharge Date: 06/27/18


1 Day Prior to Anticipated Discharge Date: 06/28/18


Anticipated Discharge Date: 06/29/18





- Discharge Skin Conditions


Unhealed Pressure Ulcer (1 or more/Stage 1 or >)-Discharge: 1. Yes


# Stage 2 Pressure Ulcers-Discharge: 1 (Left buttock)


# of These Stage 2 Pressure Ulcers Present on Admission: 0

## 2018-10-11 NOTE — PDMN
Medical Necessity


Medical necessity: Pt meets IP criteria per MD; est los >2 mn for eval/tx of 

acute encephalopathy, dehydration, hyponatremia, hematuria & cough w/concerns 

for aspiration; requiring further workup/monitoring, ID consult, IVFs & 

therapies; comorbid advanced age, recent hospitalizations for stroke w/

persistent L-sided weakness & multidrug resistant UTI, BPH w/urinary retention, 

indwelling santiago currently in place & recent santiago trauma, CAD, HTN, dementia, 

diabetes, chronic leukocytosis; per H&P & order 10/11/18

## 2018-10-11 NOTE — PDGENHP
History and Physical





- Chief Complaint


ams





- History of Present Illness


This is a 82 yo male who presented to the ER from his Urologists office for AMS 

and weakness. He has a complicated PMHx. He had a right sided internal capsule 

stroke around memorial day while visiting friends in [place]. He was 

hospitalized there and eventually admitted to our Rehab. He was continued on an 

Aspirin and Plavix was started. He spent several weeks at our rehab and was 

sent to a SNF in [place] during which he declined and was transferred to a 

Rehab center "[facility]" in [place]. The pt has a long standing BPH and 

urinary retention and had Santiago trauma while at the rehab which required 

transfer to [facility] for management. There he had a UCX on 9/9 which 

was c/w Klebsiela Pneumonia with multi drug resistance and was treated with 

Meropenem for 14 days. He was discharged with from [facility] back to 

[facility] and completed his abx regimen. He was transferred to a new SNF 

in [place] on Monday and since Monday he has begun to progressively decline 

and over the past 2 days he has been noted to be confused. He has not had any 

fever. He has developed an intermittent cough. 





Over the past few days he has had recurrence of the hematuria which is why he 

went to his Urology appt today. 





It is unclear if he is still on abx. The family and his son report that he is 

on an unknown oral antibiotic and that it was started after completion of the 

Meropenem. However, the med list from the nursing home does not support this. 

In the ER he is noted to be pleasantly confused. VS are reassuring. He does not 

have a fever. He has a chronic indwelling Santiago. 





He is able to follow commands. He is awake. 





His Na is noted to be low





PMHx:


CAD, HTN, IDDM, HLD, Dementia, BPH, indwelling santiago, spinal stenosis





PSHx: TURP, bilateral cataracts, Coronary Angiography, knee surgery





Soc: , originally from [facility]





FmHx: non contributory





History Information





- Allergies/Home Medication List


Allergies/Adverse Reactions: 








pseudoephedrine HCl [From Sudafed] Allergy (Unknown, Verified 10/11/18 10:33)


 


amoxicillin [From Augmentin] Allergy (Verified 10/11/18 10:33)


 


clavulanic acid [From Augmentin] Allergy (Verified 10/11/18 10:33)


 


dextromethorphan Allergy (Verified 10/11/18 10:33)


 


guaifenesin [Guaifenesin] Allergy (Verified 10/11/18 10:33)


 


pseudoephedrine Allergy (Verified 10/11/18 10:33)


 


Sudafed Allergy (Unknown, Uncoded 01/13/18 18:16)


 





Home Medications: 








Acetaminophen [Tylenol 325mg (*)] 650 mg PO Q4 PRN 05/30/18 [Last Taken Unknown]


Aspirin EC [Aspirin EC 81 mg (*)] 81 mg PO DAILY 05/30/18 [Last Taken Unknown]


Atorvastatin Calcium [Lipitor 40 mg (*)] 40 mg PO DAILY 05/30/18 [Last Taken 

Unknown]


Clopidogrel Bisulfate [Clopidogrel] 75 mg PO DAILY 05/30/18 [Last Taken Unknown]


Sotalol HCl [SOTALOL] 80 mg PO BID 05/30/18 [Last Taken Unknown]


Tamsulosin HCl [Flomax 0.4 MG (*)] 0.4 mg PO DAILY 05/30/18 [Last Taken Unknown]


ARIPiprazole [Abilify 10 mg (*)] 10 mg PO DAILY 10/11/18 [Last Taken Unknown]


Benzonatate [Tessalon Pearles (RX)] 100 mg PO TID 10/11/18 [Last Taken Unknown]


Calcium Carbonate [Tums 500MG (*)] 500 mg PO TID 10/11/18 [Last Taken Unknown]


Docusate Sodium [Colace 100 MG (*)] 100 mg PO HS 10/11/18 [Last Taken Unknown]


FLUoxetine [Prozac 20 MG (*)] 40 mg PO BID 10/11/18 [Last Taken Unknown]


Herbals/Supplements -Info Only 1 ea PO DAILY 10/11/18 [Last Taken Unknown]


Hyoscyamine Sulfate [Levsin, Hyomax-Sl 0.125 mg (*)] 0.125 mg SL Q4HRS PRN 10/11

/18 [Last Taken Unknown]


Insulin Glargine [Lantus Syringe] 10 units SC DAILY 10/11/18 [Last Taken Unknown

]


Lisinopril [Zestril 40 mg (*)] 40 mg PO DAILY 10/11/18 [Last Taken Unknown]


Magnesium Hydroxide [Milk of Magnesia] 2,400 mg PO DAILY PRN 10/11/18 [Last 

Taken Unknown]


Multivitamins [Multivitamin (*)] 1 each PO DAILY 10/11/18 [Last Taken Unknown]


Pantoprazole Sodium [Protonix 40mg (*)] 40 mg PO DAILY18 10/11/18 [Last Taken 

Unknown]


amLODIPine BESYLATE [Norvasc 10 mg (*)] 10 mg PO DAILY 10/11/18 [Last Taken 

Unknown]


busPIRone [Buspar (*)] 7.5 mg PO BID 10/11/18 [Last Taken Unknown]





I have personally reviewed and updated: social history, surgical history





- Social History


Smoking Status: Never smoked





Review of Systems


Review of Systems: 





ROS: 10pt was reviewed & negative except for what was stated in HPI & below





Physical Exam


Physical Exam: 

















Temp Pulse Resp BP Pulse Ox


 


 36.6 C   67   16   148/72 H  93 


 


 10/11/18 14:22  10/11/18 14:09  10/11/18 14:09  10/11/18 14:09  10/11/18 14:09











Constitutional: chronically ill appearing


Eyes: PERRL


Ears, Nose, Mouth, Throat: dry mucous membranes


Cardiovascular: regular rate and rhythym, No edema


Respiratory: no respiratory distress, no rales or rhonchi, clear to auscultation


Gastrointestinal: normoactive bowel sounds, soft, non-tender abdomen


Skin: warm


Musculoskeletal: generalized weakness


Neurologic: No AAOx3


Psychiatric: encephalopathic


Lymph, Heme, Immunologic: No petechiae





Lab Data & Imaging Review





 10/11/18 11:14





 10/11/18 11:14














WBC  12.47 10^3/uL (3.80-9.50)  H  10/11/18  11:14    


 


RBC  4.17 10^6/uL (4.40-6.38)  L  10/11/18  11:14    


 


Hgb  11.7 g/dL (13.7-17.5)  L  10/11/18  11:14    


 


Hct  34.1 % (40.0-51.0)  L  10/11/18  11:14    


 


MCV  81.8 fL (81.5-99.8)   10/11/18  11:14    


 


MCH  28.1 pg (27.9-34.1)   10/11/18  11:14    


 


MCHC  34.3 g/dL (32.4-36.7)   10/11/18  11:14    


 


RDW  14.2 % (11.5-15.2)   10/11/18  11:14    


 


Plt Count  251 10^3/uL (150-400)   10/11/18  11:14    


 


MPV  8.5 fL (8.7-11.7)  L  10/11/18  11:14    


 


Neut % (Auto)  Not Reported   10/11/18  11:14    


 


Lymph % (Auto)  Not Reported   10/11/18  11:14    


 


Mono % (Auto)  Not Reported   10/11/18  11:14    


 


Eos % (Auto)  Not Reported   10/11/18  11:14    


 


Baso % (Auto)  Not Reported   10/11/18  11:14    


 


Nucleat RBC Rel Count  Not Reported   10/11/18  11:14    


 


Absolute Neuts (auto)  Not Reported   10/11/18  11:14    


 


Absolute Lymphs (auto)  Not Reported   10/11/18  11:14    


 


Absolute Monos (auto)  Not Reported   10/11/18  11:14    


 


Absolute Eos (auto)  Not Reported   10/11/18  11:14    


 


Absolute Basos (auto)  Not Reported   10/11/18  11:14    


 


Absolute Nucleated RBC  Not Reported   10/11/18  11:14    


 


Immature Gran %  Not Reported   10/11/18  11:14    


 


Seg Neutrophils %  59.6 %  10/11/18  11:14    


 


Band Neutrophils %  9.1 %  10/11/18  11:14    


 


Lymphocytes %  14.1 %  10/11/18  11:14    


 


Monocytes %  8.1 %  10/11/18  11:14    


 


Eosinophils %  0.0 %  10/11/18  11:14    


 


Basophils %  0.0 %  10/11/18  11:14    


 


Metamyelocytes %  1.0 %  10/11/18  11:14    


 


Myelocytes %  1.0 %  10/11/18  11:14    


 


Promyelocytes %  0.0 %  10/11/18  11:14    


 


Blast Cells %  0.0 %  10/11/18  11:14    


 


Immature Gran #  Not Reported   10/11/18  11:14    


 


Absolute Seg Neuts  7.43 10^/uL (1.70-6.50)  H  10/11/18  11:14    


 


Absolute Band Neuts  1.13 10^3/uL (0.00-0.70)  H  10/11/18  11:14    


 


Absolute Lymphocytes  1.76 10^3/uL (1.00-3.00)   10/11/18  11:14    


 


Absolute Monocytes  1.01 10^3/uL (0.30-0.80)  H  10/11/18  11:14    


 


Absolute Eosinophils  0.00 10^3/uL (0.03-0.40)  L  10/11/18  11:14    


 


Absolute Basophils  0.00 10^3/uL (0.02-0.10)  L  10/11/18  11:14    


 


Absolute Metamyelocyte  0.12 10^3/mL (0.00-0.00)  H  10/11/18  11:14    


 


Absolute Myelocytes  0.12 10^3/mL (0.00-0.00)  H  10/11/18  11:14    


 


Absolute Promyelocytes  0.00 10^3/uL (0.00-0.00)   10/11/18  11:14    


 


Absolute Plasma Cells  0.00 10^3/uL (0.00-0.00)   10/11/18  11:14    


 


Nucleated RBCs  0 /100 WBC (0-0)   10/11/18  11:14    


 


Atypical Lymphocytes  1+  H  10/11/18  11:14    


 


Absolute Blast Cells  0.00 10^3/uL (0.00-0.00)   10/11/18  11:14    


 


Plasma Cells %  0.0 %  10/11/18  11:14    


 


Platelet Estimate  ADEQUATE  (ADEQ)   10/11/18  11:14    


 


Echinocytes  1+  H  10/11/18  11:14    


 


PT  13.9 SEC (12.0-15.0)   10/11/18  11:14    


 


INR  1.05  (0.83-1.16)   10/11/18  11:14    


 


APTT  29.2 SEC (23.0-38.0)   10/11/18  11:14    


 


Sodium  129 mEq/L (135-145)  L  10/11/18  11:14    


 


Potassium  4.9 mEq/L (3.3-5.0)   10/11/18  11:14    


 


Chloride  92 mEq/L ()  L  10/11/18  11:14    


 


Carbon Dioxide  26 mEq/l (22-31)   10/11/18  11:14    


 


Anion Gap  11 mEq/L (6-14)   10/11/18  11:14    


 


BUN  18 mg/dL (7-23)   10/11/18  11:14    


 


Creatinine  0.7 mg/dL (0.7-1.3)   10/11/18  11:14    


 


Estimated GFR  > 60   10/11/18  11:14    


 


Glucose  237 mg/dL ()  H  10/11/18  11:14    


 


Calcium  9.2 mg/dL (8.5-10.4)   10/11/18  11:14    


 


Urine Color  RED   10/11/18  11:13    


 


Urine Appearance  CLOUDY   10/11/18  11:13    


 


Urine pH  5.0  (5.0-7.5)   10/11/18  11:13    


 


Ur Specific Gravity  1.025  (1.002-1.030)   10/11/18  11:13    


 


Urine Protein  3+  (NEGATIVE)  H  10/11/18  11:13    


 


Urine Ketones  TNP   10/11/18  11:13    


 


Urine Blood  3+  (NEGATIVE)  H  10/11/18  11:13    


 


Urine Nitrate  POSITIVE  (NEGATIVE)  H  10/11/18  11:13    


 


Urine Bilirubin  NEGATIVE  (NEGATIVE)   10/11/18  11:13    


 


Urine Urobilinogen  1.0 EU (0.2-1.0)   10/11/18  11:13    


 


Ur Leukocyte Esterase  2+  (NEGATIVE)  H  10/11/18  11:13    


 


Urine RBC  >182 /hpf (0-3)  H  10/11/18  11:13    


 


Urine WBC  10-15 /hpf (0-3)  H  10/11/18  11:13    


 


Ur Epithelial Cells  NONE SEEN /lpf (NONE-1+)   10/11/18  11:13    


 


Urine Bacteria  2+ /hpf (NONE SEEN)  H  10/11/18  11:13    


 


Urine Glucose  TNP   10/11/18  11:13    











Assessment & Plan


Assessment: 





#Acute Encephalopathy, etiology is likely multifactorial





#Dehydration





#Subacute Hyponatremia, likely due to dehydration


-will get IVF


-check urine studies





#Recent multidrug resistant UTI, unclear if he is still on abx. unclear if 

contributing to current symptoms, unclear if persistent infection


-hemodynamically doing well


-has indwelling Santiago


-Afebrile


-was treated with Meropenem. Unclear if he is still on abx. The family is 

clarifying


-Given confusion is his main symptom, I will see if he gets better with just 

IVF. ID will see him as well to determine need for restarting meropenem. A 

urine culture has been sent. Blood cultures will be sent as well. Low threshold 

for starting abx if he decompensates tonight, but off abx for now





#BPH, Urinary Retention





#Hematuria


-this has been intermittent since starting Plavix. Sometimes related to trauma


-current etiology unclear


-Urology to see. Santiago is currently draining


-hold Aspirin and Plavix for nowabx for now





#Cough


-CXR reviewed and no e/o pneumonia


-exam is reassuring


-will monitor for now





#concerns for aspiration


-SLP to evaluate





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


-no changes to his weakness


-will hold Aspirin and Plavix for now. He developed the stroke with on Aspirin. 

Can likely stop Aspirin and cont Plavix once hematuria improves





#HTN


-restart home meds





#IDDM with hyperglycemia


-cont home insulin


-start ISS


-check A1C





Plan:


Admission


ID to see


IVF to determine clinical response


will have Urology consult


SCD's

## 2018-10-11 NOTE — EDPHY
H & P


Stated Complaint: Hematuria, Tx c IV [id number], oral ABX currently


Time Seen by Provider: 10/11/18 10:40


HPI/ROS: 





CHIEF COMPLAINT:  Altered mental status





HISTORY OF PRESENT ILLNESS:  The patient is brought to the emergency department 

today for altered mental status.  He has a fairly complicated past medical 

history which starts with a stroke in the summer of this year.  The patient had 

been at a rehab unit here [facility] ultimately was transition 

to a skilled nursing facility.  He apparently had a rapid decline while at that 

facility which led him to be hospitalized and [facility].  He reportedly 

developed Klebsiella infection.  Family is uncertain whether that was blood or 

a urinary in nature.  He was treated with IV antibiotics for 21 days.  He 

finished that approximately 10 days ago.  Within 2 days of stopping the IV 

antibiotics he developed recurrent cloudy urine and reportedly was started on 

oral antibiotic which she has been on for the past 10 days.  The patient did 

have a catheter change on Sunday.  He is now staying at a skilled nursing 

facility in [place].  Yesterday he developed recurrent hematuria, cloudy urine 

mental status changes.  The patient was following up today with Urology.  He 

was noted to have a balanitis felt to be secondary to Candida.  In the setting 

of his altered mental status he was sent to the ED for further evaluation.  The 

patient is also had a slightly progressive cough.  The patient does have 

chronic left-sided weakness from a prior stroke.





REVIEW OF SYSTEMS:


A comprehensive 10 point review of systems is otherwise negative aside from 

elements mentioned in the history of present illness.


Source: Patient


Exam Limitations: No limitations





- Medical/Surgical History


Hx Asthma: No


Hx Chronic Respiratory Disease: No


Hx Diabetes: Yes


Hx Cardiac Disease: Yes


Hx Renal Disease: No


Hx Cirrhosis: No


Hx Alcoholism: No


Hx HIV/AIDS: No


Hx Splenectomy or Spleen Trauma: No


Other PMH: 3 stents 1999, BPH, urinary retention x3, HIGH CHOL, CATARACT SURG, 

DIABETES





- Social History


Smoking Status: Never smoked





- Physical Exam


Exam: 





General Appearance:  Alert, no distress


Eyes:  Pupils equal and round no pallor or injection


ENT, Mouth:  Mucous membranes moist


Respiratory:  Rhonchorous breath sounds bilateral lung bases


Cardiovascular:  Regular rate and rhythm


Gastrointestinal:  Abdomen is soft and nontender, no masses, bowel sounds normal


Genitourinary:  Candidal balanitis is noted, indwelling Newman catheter present


Neurological:  Weakness noted on the left side consistent with prior stroke


Skin:  Warm and dry, no rashes


Musculoskeletal:  Neck is supple nontender


Extremities:  symmetrical, full range of motion








Constitutional: 


 Initial Vital Signs











Temperature (C)  37.0 C   10/11/18 10:33


 


Heart Rate  68   10/11/18 10:33


 


Respiratory Rate  16   10/11/18 10:33


 


Blood Pressure  117/66   10/11/18 10:33


 


O2 Sat (%)  95   10/11/18 10:33








 











O2 Delivery Mode               Room Air














Allergies/Adverse Reactions: 


 





pseudoephedrine HCl [From Sudafed] Allergy (Unknown, Verified 10/11/18 10:33)


 


amoxicillin [From Augmentin] Allergy (Verified 10/11/18 10:33)


 


clavulanic acid [From Augmentin] Allergy (Verified 10/11/18 10:33)


 


dextromethorphan Allergy (Verified 10/11/18 10:33)


 


guaifenesin [Guaifenesin] Allergy (Verified 10/11/18 10:33)


 


pseudoephedrine Allergy (Verified 10/11/18 10:33)


 


Sudafed Allergy (Unknown, Uncoded 01/13/18 18:16)


 








Home Medications: 














 Medication  Instructions  Recorded


 


Acetaminophen [Tylenol 325mg (*)] 650 mg PO Q4 PRN 05/30/18


 


Aspirin EC [Aspirin EC 81 mg (*)] 81 mg PO DAILY 05/30/18


 


Atorvastatin Calcium [Lipitor 40 40 mg PO DAILY 05/30/18





mg (*)]  


 


Bisacodyl [Magic Bullet 10 mg] 10 mg OH DAILY PRN 05/30/18


 


Clopidogrel Bisulfate [Clopidogrel] 75 mg PO DAILY 05/30/18


 


Donepezil HCl [Aricept 5 MG (*)] 5 mg PO HS 05/30/18


 


Polyethylene Glycol 3350 [Miralax 17 gm PO DAILY PRN 05/30/18





17 gm (*)]  


 


Sotalol HCl [SOTALOL] 80 mg PO BID 05/30/18


 


Tamsulosin HCl [Flomax 0.4 MG (*)] 0.4 mg PO DAILY 05/30/18


 


Docusate Sodium [Colace 100 MG (*)] 100 mg PO EVERY OTHER DAY  cap 06/28/18


 


FLUoxetine [Prozac 20 MG (*)] 20 mg PO DAILY  cap 06/28/18


 


Fluticasone Nasal [Flonase Nasal 2 sprays EACHNARE DAILY  mdi 06/28/18





Spray]  


 


Gabapentin [Neurontin 100 MG (*)] 100 mg PO TID  cap 06/28/18


 


Lisinopril [Zestril 20 mg (*)] 30 mg PO DAILY  tab 06/28/18


 


Nystatin Powder [Mycostatin Powder] 1 sara TP TID  powder 06/28/18


 


Psyllium Seed [Metamucil (*)] 1 each PO DAILY  pkt 06/28/18


 


metFORMIN HCL [Glucophage 500 mg 1,000 mg PO BIDMEAL  tab 06/28/18





(*)]  


 


sitaGLIPtin PHOSPHATE [Januvia 100 100 mg PO DAILY  tab 06/28/18





MG (*)]  


 


Insulin Glargine [Lantus Syringe] 9 units SC HS  unit 06/29/18


 


Insulin Lispro [HumaLOG LISPRO] 0 unit SC TIDMEAL #1 unit 06/29/18














Medical Decision Making





- Diagnostics


Imaging Results: 


 Imaging Impressions





Chest X-Ray  10/11/18 11:57


Impression:


1. No acute process.


2. 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











ED Course/Re-evaluation: 





The patient presents to the ED with complaints of a progressive cough, 

increasing weakness and reported cloudy urine with hematuria.





The patient has no evidence of septic physiology.  His vital signs are stable.  

His neurologic examination does demonstrate a deconditioned man with some 

component of encephalopathy and chronic left-sided weakness.





A urine culture has been obtained.  I am attempting to obtain old records.





In the setting of the patient's acute mental status changes I do feel he should 

be admitted to the hospital for further evaluation and management.





Consultation made with Dr. Neville from the hospitalist service.





I did review the patient's records in [facility].  I also reviewed his nursing home 

MAR and see no obvious oral antibiotics prescribed for the patient.  He had 

been on meropenem for 14 days which should have finished around the 26th of September.








Differential Diagnosis: 





Differential diagnosis considered includes sepsis, pneumonia, dehydration, 

metabolic abnormality, complicated urinary tract infection





- Data Points


Laboratory Results: 


 Laboratory Results





 10/11/18 11:14 





 10/11/18 11:14 





 











  10/11/18 10/11/18 10/11/18





  11:14 11:14 11:14


 


WBC      12.47 10^3/uL H 10^3/uL





     (3.80-9.50) 


 


RBC      4.17 10^6/uL L 10^6/uL





     (4.40-6.38) 


 


Hgb      11.7 g/dL L g/dL





     (13.7-17.5) 


 


Hct      34.1 % L %





     (40.0-51.0) 


 


MCV      81.8 fL fL





     (81.5-99.8) 


 


MCH      28.1 pg pg





     (27.9-34.1) 


 


MCHC      34.3 g/dL g/dL





     (32.4-36.7) 


 


RDW      14.2 % %





     (11.5-15.2) 


 


Plt Count      251 10^3/uL 10^3/uL





     (150-400) 


 


MPV      8.5 fL L fL





     (8.7-11.7) 


 


Neut % (Auto)      Not Reported 





    


 


Lymph % (Auto)      Not Reported 





    


 


Mono % (Auto)      Not Reported 





    


 


Eos % (Auto)      Not Reported 





    


 


Baso % (Auto)      Not Reported 





    


 


Nucleat RBC Rel Count      Not Reported 





    


 


Absolute Neuts (auto)      Not Reported 





    


 


Absolute Lymphs (auto)      Not Reported 





    


 


Absolute Monos (auto)      Not Reported 





    


 


Absolute Eos (auto)      Not Reported 





    


 


Absolute Basos (auto)      Not Reported 





    


 


Absolute Nucleated RBC      Not Reported 





    


 


Immature Gran %      Not Reported 





    


 


Seg Neutrophils %      59.6 % %





    


 


Band Neutrophils %      9.1 % %





    


 


Lymphocytes %      14.1 % %





    


 


Monocytes %      8.1 % %





    


 


Eosinophils %      0.0 % %





    


 


Basophils %      0.0 % %





    


 


Metamyelocytes %      1.0 % %





    


 


Myelocytes %      1.0 % %





    


 


Promyelocytes %      0.0 % %





    


 


Blast Cells %      0.0 % %





    


 


Immature Gran #      Not Reported 





    


 


Absolute Seg Neuts      7.43 10^/uL H 10^/uL





     (1.70-6.50) 


 


Absolute Band Neuts      1.13 10^3/uL H 10^3/uL





     (0.00-0.70) 


 


Absolute Lymphocytes      1.76 10^3/uL 10^3/uL





     (1.00-3.00) 


 


Absolute Monocytes      1.01 10^3/uL H 10^3/uL





     (0.30-0.80) 


 


Absolute Eosinophils      0.00 10^3/uL L 10^3/uL





     (0.03-0.40) 


 


Absolute Basophils      0.00 10^3/uL L 10^3/uL





     (0.02-0.10) 


 


Absolute Metamyelocyte      0.12 10^3/mL H 10^3/mL





     (0.00-0.00) 


 


Absolute Myelocytes      0.12 10^3/mL H 10^3/mL





     (0.00-0.00) 


 


Absolute Promyelocytes      0.00 10^3/uL 10^3/uL





     (0.00-0.00) 


 


Absolute Plasma Cells      0.00 10^3/uL 10^3/uL





     (0.00-0.00) 


 


Nucleated RBCs      0 /100 WBC /100 WBC





     (0-0) 


 


Atypical Lymphocytes      1+  H 





    


 


Absolute Blast Cells      0.00 10^3/uL 10^3/uL





     (0.00-0.00) 


 


Plasma Cells %      0.0 % %





    


 


Platelet Estimate      ADEQUATE 





     (ADEQ) 


 


Echinocytes      1+  H 





    


 


PT    13.9 SEC SEC  





    (12.0-15.0)  


 


INR    1.05   





    (0.83-1.16)  


 


APTT    29.2 SEC SEC  





    (23.0-38.0)  


 


Sodium  129 mEq/L L mEq/L    





   (135-145)   


 


Potassium  4.9 mEq/L mEq/L    





   (3.3-5.0)   


 


Chloride  92 mEq/L L mEq/L    





   ()   


 


Carbon Dioxide  26 mEq/l mEq/l    





   (22-31)   


 


Anion Gap  11 mEq/L mEq/L    





   (6-14)   


 


BUN  18 mg/dL mg/dL    





   (7-23)   


 


Creatinine  0.7 mg/dL mg/dL    





   (0.7-1.3)   


 


Estimated GFR  > 60     





    


 


Glucose  237 mg/dL H mg/dL    





   ()   


 


Calcium  9.2 mg/dL mg/dL    





   (8.5-10.4)   


 


Urine Color      





    


 


Urine Appearance      





    


 


Urine pH      





    


 


Ur Specific Gravity      





    


 


Urine Protein      





    


 


Urine Ketones      





    


 


Urine Blood      





    


 


Urine Nitrate      





    


 


Urine Bilirubin      





    


 


Urine Urobilinogen      





    


 


Ur Leukocyte Esterase      





    


 


Urine RBC      





    


 


Urine WBC      





    


 


Ur Epithelial Cells      





    


 


Urine Bacteria      





    


 


Urine Glucose      





    














  10/11/18





  11:13


 


WBC  





  


 


RBC  





  


 


Hgb  





  


 


Hct  





  


 


MCV  





  


 


MCH  





  


 


MCHC  





  


 


RDW  





  


 


Plt Count  





  


 


MPV  





  


 


Neut % (Auto)  





  


 


Lymph % (Auto)  





  


 


Mono % (Auto)  





  


 


Eos % (Auto)  





  


 


Baso % (Auto)  





  


 


Nucleat RBC Rel Count  





  


 


Absolute Neuts (auto)  





  


 


Absolute Lymphs (auto)  





  


 


Absolute Monos (auto)  





  


 


Absolute Eos (auto)  





  


 


Absolute Basos (auto)  





  


 


Absolute Nucleated RBC  





  


 


Immature Gran %  





  


 


Seg Neutrophils %  





  


 


Band Neutrophils %  





  


 


Lymphocytes %  





  


 


Monocytes %  





  


 


Eosinophils %  





  


 


Basophils %  





  


 


Metamyelocytes %  





  


 


Myelocytes %  





  


 


Promyelocytes %  





  


 


Blast Cells %  





  


 


Immature Gran #  





  


 


Absolute Seg Neuts  





  


 


Absolute Band Neuts  





  


 


Absolute Lymphocytes  





  


 


Absolute Monocytes  





  


 


Absolute Eosinophils  





  


 


Absolute Basophils  





  


 


Absolute Metamyelocyte  





  


 


Absolute Myelocytes  





  


 


Absolute Promyelocytes  





  


 


Absolute Plasma Cells  





  


 


Nucleated RBCs  





  


 


Atypical Lymphocytes  





  


 


Absolute Blast Cells  





  


 


Plasma Cells %  





  


 


Platelet Estimate  





  


 


Echinocytes  





  


 


PT  





  


 


INR  





  


 


APTT  





  


 


Sodium  





  


 


Potassium  





  


 


Chloride  





  


 


Carbon Dioxide  





  


 


Anion Gap  





  


 


BUN  





  


 


Creatinine  





  


 


Estimated GFR  





  


 


Glucose  





  


 


Calcium  





  


 


Urine Color  RED 





  


 


Urine Appearance  CLOUDY 





  


 


Urine pH  5.0 





   (5.0-7.5) 


 


Ur Specific Gravity  1.025 





   (1.002-1.030) 


 


Urine Protein  3+  H 





   (NEGATIVE) 


 


Urine Ketones  TNP 





  


 


Urine Blood  3+  H 





   (NEGATIVE) 


 


Urine Nitrate  POSITIVE  H 





   (NEGATIVE) 


 


Urine Bilirubin  NEGATIVE 





   (NEGATIVE) 


 


Urine Urobilinogen  1.0 EU EU





   (0.2-1.0) 


 


Ur Leukocyte Esterase  2+  H 





   (NEGATIVE) 


 


Urine RBC  >182 /hpf H /hpf





   (0-3) 


 


Urine WBC  10-15 /hpf H /hpf





   (0-3) 


 


Ur Epithelial Cells  NONE SEEN /lpf /lpf





   (NONE-1+) 


 


Urine Bacteria  2+ /hpf H /hpf





   (NONE SEEN) 


 


Urine Glucose  TNP 





  














Departure





- Departure


Disposition: [facility]


Clinical Impression: 


 History of ischemic right ICA stroke, Altered mental status, Hyponatremia





Condition: Fair

## 2018-10-11 NOTE — ASMTCMCOM
CM Note

 

CM Note                       

Notes:

Pt is a 82 y/o man admitted for 82 y/o man admitted for ams and weakness. Pt had a stroke recently 

and went to rehab at "[facility]" afterwards. Pt was transferred to a SNF in [place] this past 

Monday when he started to decompensate. PT and SPL have been ordered and awaiting 

recommendations. Wound care and ID has been consulted. Needs are TBD at this time. CM to follow.







Plan: TBD

 

Date Signed:  10/11/2018 04:18 PM

Electronically Signed By:LATRICE Moore

## 2018-10-11 NOTE — ASMTLACE
LACE

 

Acuity / Level of             Answers:  Yes                                   

Care: Did the patient                                                         

have an inpatient                                                             

admission?                                                                    

Comorbidities - select        Answers:  Cerebrovascular disease               

all that apply                          (CVA, TIA, aneurysms, vasc            

                                        ular dementia)                        

                                        Coronary Artery Disease               

                                        Dementia                              

                                        Diabetes (uncontrolled or             

                                        controlled)                           

                                        Opioid dependence                     

                                        / Chronic pain                        

                                        Previous myocardial                   

                                        infarction                            

                                        Other                         Notes:  HTN; HLD

# of Emergency department     Answers:  1-2                                   

visits in the last 6                                                          

months                                                                        

Score: 17

 

Date Signed:  10/11/2018 04:11 PM

Electronically Signed By:Jacqueline Alfaro

## 2018-10-12 NOTE — HOSPPROG
Hospitalist Progress Note


Assessment/Plan: 





#Acute Encephalopathy, etiology is likely multifactorial


-Appears to be improving. Able to have a conversation. Knows that he is at [facility]





#Dehydration, improving


-I will give one more liter today





#Subacute Hyponatremia, likely due to dehydration


-Etiology is likely multifactorial. 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 

inappropriate excretion of Na, but likely mixed picture with Dehydration


-will repeat Na tomorrow. May benefit from fluid restriction and/or Salt but 

will treat dehydration first





#Recent multidrug resistant UTI


-He was recently changed to Bactrim for unclear reasons as culture showed 

resistance to this. This was discontinued at the time of transfer to his new SNF


-hemodynamically doing well


-has indwelling Newman


-Afebrile


-was treated with Meropenem previously


-will cont to observe off abx as clinical improvement is noted. UCX still 

negative but may have colonization. appreciate ID consultation. Discussed plan 

with them today





#BPH, Urinary Retention





#Hematuria


-this has been intermittent since starting Plavix. Sometimes related to trauma


-current etiology unclear


-Urology will not see as they prefer to have the pt f/u as an op setting. His 

Newman cont to drain well and hematuria is improving


-cont to hold Aspirin and Plavix but will likely restart Plavix tomorrow





#Balanitis: cont antifungal cream, will f/u with Urology





#Cough


-CXR reviewed and no e/o pneumonia


-exam is reassuring


-will monitor for now





#concerns for aspiration


-SLP to evaluate





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


-no changes to his weakness


-will hold Aspirin and Plavix for now. He developed the stroke with on Aspirin. 

Can likely stop Aspirin and cont Plavix once hematuria improves





#HTN


-restart home meds





#IDDM with hyperglycemia


-cont home insulin


-start ISS


-A1C is 7.0, which is improved from previous





Plan:


additional 1 L IVF


appreciate ID, cont to hold off on abx.


f/u with Urology


restart Plavix tomorrow


SCD's





Subjective: more alert. able to hold a conversation. no cp or sob. hematuria is 

improving


Objective: 


 Vital Signs











Temp Pulse Resp BP Pulse Ox


 


 36.8 C   66   18   144/75 H  92 


 


 10/12/18 04:17  10/12/18 09:19  10/12/18 04:17  10/12/18 09:19  10/12/18 04:17








 Laboratory Results





 10/12/18 04:32 





 10/12/18 04:32 





 











 10/11/18 10/12/18 10/13/18





 05:59 05:59 05:59


 


Intake Total  261 


 


Output Total  975 


 


Balance  -714 








 











PT  13.9 SEC (12.0-15.0)   10/11/18  11:14    


 


INR  1.05  (0.83-1.16)   10/11/18  11:14    














- Physical Exam


Constitutional: no apparent distress, chronically ill appearing


Eyes: PERRL, EOMI


Ears, Nose, Mouth, Throat: moist mucous membranes, hearing normal


Cardiovascular: regular rate and rhythym


Respiratory: no respiratory distress, no rales or rhonchi, clear to auscultation


Gastrointestinal: normoactive bowel sounds


Skin: warm


Musculoskeletal: generalized weakness


Neurologic: AAOx3


Psychiatric: interacting appropriately, not anxious, not encephalopathic


Lymph, Heme, Immunologic: No petechiae





ICD10 Worksheet


Patient Problems: 


 Problems











Problem Status Onset


 


Altered mental status Acute  


 


History of ischemic right ICA stroke Acute  


 


Hyponatremia Acute  


 


Cerebrovascular accident (CVA) involving left cerebral hemisphere Acute

## 2018-10-12 NOTE — WOCRNPDOC
WOCRN Advanced Assessment Note





- Skin Integrity Problem, Advanced Assess


  ** Right Ischial Tuberosity Pressure Injury


Dressing Type: Open to Air


Exudate Amount: None


Iris Wound Tissue: Blanching


Iris Wound Swelling: None


Wound Bed Color: Purple


Site Measurement - Head-to-Toe Length X Width X Depth (cm): [id number]


Pressure Injury Stage: Deep Tissue Injury (DTI)


Pressure Injury Present on Admit: Yes


Skin Integrity Problem Comment: Calazime cream cleaned from buttocks. Small 

purple area on right ishium that is a present on admission deep tissue injury. 

Surrounding skin is red and blanching. Redness likely due to fecal 

incontinence. Patient does have a santiago catheter. The purple area is likely to 

evolve over the next week or so and may open up. Please notify wound care if 

the area opens up. Wound care will round again next week.

## 2018-10-12 NOTE — GCON
DATE OF CONSULTATION:  10/12/2018



REFERRING PHYSICIAN:  Frederick Neville MD



REASON FOR CONSULTATION:  Possible ESBL producing Klebsiella pneumonia UTI.



HISTORY OF PRESENT ILLNESS:  The patient is an 83-year-old male who I am asked to see in consultation
 for consideration of ESBL producing Klebsiella pneumoniae UTI.  The patient has a complex history ov
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
 and rehabilitation.  Ultimately, he was transferred to a skilled nursing facility in [place] but ha
d subsequent decline and was transferred to a rehab center in [place].  The patient experienced Fol
ey catheter trauma while in rehabilitation, prompting transfer to [facility].  There, he was 
noted to have a fever and confusion which prompted urine cultures on 09/08/2018, which showed greater
 than 100,000 ESBL producing Klebsiella pneumoniae.  This was susceptible to amikacin and carbapenems
.  He was treated with a course of meropenem for his infection and felt to have shown clinical improv
ement.  He recently had return to skilled nursing facility in [place] earlier this week and was note
d to have gross hematuria yesterday.  He was seen in followup by Urology and referred to the hospital
 for further evaluation.  He was also noted to have worsening mental status with confusion.  The mónica
ent's son also brought a urine culture which was obtained on 10/10/2018, which showed greater than 10
0,000 Klebsiella pneumoniae which is an ESBL producing organism susceptible carbapenems.  The patient
 was admitted yesterday for the above findings and has been treated with IV rehydration but observed 
without antibiotic therapy.  He continues to have some confusion, but is able to interact during the 
course of an examination.  Laboratory evaluation revealed greater than 182 red blood cells and 10-15 
white blood cells on urinalysis with 2+ bacteria.  Urine culture is showing a gram-negative orestes which
 is a lactose .  Blood cultures x2 are pending.  The patient has not had any demonstrable fe
danny at time of presentation.  White blood cell count is mildly elevated without significant left shif
t.  Given the above findings, I am now asked to assist in his ongoing management. 



The patient had been transitioned to Bactrim in the skilled nursing facility and family notes he has 
some rash present currently.  Infectious Disease consultation is now requested to assist in his ongoi
ng management.



PAST MEDICAL HISTORY:  Coronary artery disease, stroke as above, UTI as above, hypertension, diabetes
 mellitus, dementia, BPH, recently need for indwelling Newman, spinal stenosis.



PAST SURGICAL HISTORY:  TURP, cataract surgery.



CURRENT MEDICATIONS:  Norvasc 10 mg p.o. daily, Abilify 10 mg p.o. daily, Lipitor 40 mg p.o. daily, T
essalon Perles 100 mg p.o. three times daily, BuSpar 7.5 mg p.o. twice daily, Lotrimin applied to pen
ile rash twice daily, Prozac 40 mg p.o. twice daily, Flonase spray each nares daily, Lantus insulin 1
0 units subcu daily, regular insulin sliding scale, lisinopril 40 mg p.o. daily, Protonix 40 mg p.o. 
daily, Flomax 0.4 mg p.o. daily, sotalol 80 mg p.o. twice daily.



ALLERGIES:  Augmentin with unclear reaction, guaifenesin, pseudoephedrine, dextromethorphan.



SOCIAL HISTORY:  The patient is currently residing in a skilled nursing facility.



REVIEW OF SYSTEMS:  NEUROLOGIC:  Confusion as outlined above.  The patient's family also has noted ri
ght upper extremity tremor.  Skin:  The patient has had significant penile rash which son associates 
with use of prior condom catheter.  Unless otherwise noted, the remainder of 10-system review is unob
tainable or unremarkable.



PHYSICAL EXAMINATION:  VITAL SIGNS:  Temperature 34.8 (oral), heart rate 64, respiratory rate 18, blo
od pressure 159/79, oxygen saturation 92% on room air.  GENERAL:  The patient is chronically ill appe
aring, in no acute distress.  He appears nontoxic.  HEENT:  There is no scleral icterus, conjunctival
 injection, or conjunctival petechiae.  Oropharynx shows no thrush.  There are slightly dry mucous me
mbranes.  NECK:  Supple without lymphadenopathy or thyromegaly.  CHEST:  Clear to auscultation bilate
rally without adventitious sounds.  The respiratory effort is normal.  CARDIOVASCULAR:  Regular rate 
and rhythm with a 2/6 systolic murmur heard throughout.  No gallops or rubs noted.  ABDOMEN:  Soft, n
ontender, nondistended.  There is no palpable organomegaly.  Bowel sounds are present.  MUSCULOSKELET
AL:  There is a left upper extremity contracture present.  :  Newman catheter is in place.  There is
 balanitis present over the penis.  NEUROLOGIC:  The patient answers questions when asked with all re
sponses being no.  Contracture as outlined above.  SKIN:  See  exam.  There are scattered erythemat
ous macules over the hands and extremities bilaterally; there are faint macular rash over the lower e
xtremities bilaterally.  LYMPHATICS:  No cervical, supraclavicular or inguinal nodes palpable.



LABORATORY DATA:  White blood cell count 10.7, hematocrit 29.2, platelets 255, neutrophils 57%, lymph
ocytes 25%, monocytes 11%, eosinophils 3%.  Serum creatinine is 0.6, hemoglobin A1c 7.0.  Urinalysis 
as outlined in the history of present illness.  Blood cultures x2 sets are pending.  Urine culture sh
owing greater than 100,000 gram-negative rods which are lactose . 



Chest x-ray shows no acute process.



IMPRESSION:  Positive urine culture:  Most likely, this will represent previously isolated extended-s
pectrum beta-lactamases producing Klebsiella pneumoniae.  Difficult to assess if this is contributing
 to active urinary tract infection versus representing persistent colonization/bacteriuria.  The mónica
ent has a mildly elevated white blood cell count, but does not have left shift and there are small nu
mbers of eosinophils present which were atypical during the course of acute infection.  He does have 
decreased temperature, although this was measured orally which is more difficult to interpret.  At th
is point in time, would favor continued observation off antibiotics while blood cultures are pending.
  I have discussed with the family that positive urine cultures do not always necessitate antibiotic 
therapy and will increase pressure for increasingly resistant organisms.  Continue to follow up blood
 cultures as complex history does place the patient at risk for more significant infection as well.



RECOMMENDATIONS:  

1.  Agree with observation off antibiotics.

2.  Follow up blood cultures as available.

3.  Contact precautions for ESBL.  

Thank you for this consultation.  We will continue to follow the patient with you.





Job #:  [id number]

## 2018-10-12 NOTE — ASMTCMCOM
CM Note

 

CM Note                       

Notes:

CM met with Pt's son.  Per son, pt was well until he and his wife became ill with the flu last 

Tres.  The flu was followed by shingles, then strep, sinusitis, 2 falls from spinal stenosis 

and [facility] 3 strokes.  He was initially in [facility] acute inpt rehab.  This was followed by stays 

at other rehabs including [facility], [facility] and [facility].  He was admitted here from 

[facility]. Pt's son was unahppy with [facility] and [facility].  He does not like [facility] and has heard 

negative things about [facility].  He is willing to look at [facility], but is very tentative.  He 

wants [facility] and is aware of the difficulty in getting his father admitted there.  He was 

told that a referral will be placed for [facility] and was encouraged to visit 

[facility].  Counseling with the son over limited choices, due to his father having limited Medicare 


days left and waiting for Medicaid to be approved, will be an ongoing process. CM to follow.



D/C Plan:  TBD

 

Date Signed:  10/12/2018 02:32 PM

Electronically Signed By:Marisol Fuller

## 2018-10-13 NOTE — PCMIDPN
Assessment/Plan: 


Assessment/Plan:


* Positive urine culture with indwelling Santiago catheter (present on admission) 

with ESBL producing Klebsiella pneumoniae:  Difficult to determine if this 

represents true catheter associated UTI versus chronic colonization given he 

has had this organism previously.  Family feels he remains far from baseline 

mental status.  Will begin ertapenem 1 g IV daily to determine if this has any 

impact on mental status.  If improves, plan 7 days of therapy.


* Cough:  Likely secondary to chronic aspiration.





Clinical findings and plan reviewed family and Dr. Neville.





10/13/18 13:44





Subjective: 





Family notes patient remains far from baseline mental status.  Sleeping 

significant amounts of time.  Limited interaction.


Objective: 


 Vital Signs











Temp Pulse Resp BP Pulse Ox


 


 36.8 C   61   14   133/82 H  91 L


 


 10/13/18 12:00  10/13/18 12:00  10/13/18 12:00  10/13/18 12:00  10/13/18 12:00








 Laboratory Results





 10/13/18 04:40 





 10/13/18 04:40 





 











 10/12/18 10/13/18 10/14/18





 05:59 05:59 05:59


 


Intake Total 261 1636 


 


Output Total 975 1400 150


 


Balance -714 236 -150








No antibiotics


Blood cultures x2 no growth


Urine culture greater than 100,000 ESBL producing Klebsiella pneumoniae





- Physical Exam


General Appearance: non-toxic, other (Confused but able to state he is in 

[place])


EENT: No scleral icterus, No conjunctival petechiae


Respiratory: No respiratory distress


Cardiac/Chest: regular rate, rhythm, other (Distant heart tones)


Abdomen: non-tender, No distended


Male Genitalia: santiago, other (Balanitis present)





ICD10 Worksheet


Patient Problems: 


 Problems











Problem Status Onset


 


Altered mental status Acute  


 


History of ischemic right ICA stroke Acute  


 


Hyponatremia Acute  


 


Cerebrovascular accident (CVA) involving left cerebral hemisphere Acute

## 2018-10-13 NOTE — ASMTCMCOM
CM Note

 

CM Note                       

Notes:

Wendy from [facility] is now called [facility] came and spoke to [facility]. [facility] 

would like Wendy to come back to speak to pt. Duke needs to speak to his family about where pt 

will live. Here in [place] w/ him or back in [facility] where his primary residence is. The pt and 

family also needs to figure out w/ they want to go [facility] to the [facility] or continue w/ [facility]. Wendy 

will check in with [facility] nurses tomorrow and get an update. CM updated pts nurse Alma Delia today. CM to 

follow.







Plan: TBD

 

Date Signed:  10/13/2018 04:16 PM

Electronically Signed By:LATRICE Moore

## 2018-10-13 NOTE — HOSPPROG
Hospitalist Progress Note


Assessment/Plan: 





#Acute on chronic Encephalopathy, etiology is likely multifactorial


-Appears to be improving. Likely close to baseline





#Dehydration, resolved





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 

picture with Dehydration


-He isn't getting a significant amount of fluids so will not fluid restrict. 

Will start Salt supplementation today





#Recent multidrug Klebsiella UTI with UCx c/w Klebsiella in a pt with 

indwelling Newman


-Likely does not indicate acute infection


-ID is following


-Will cont to observe off abx


-Afebrile


-was treated with Meropenem previously





#BPH, Urinary Retention





#Hematuria


-this has been intermittent since starting Plavix. Sometimes related to trauma


-Urology will not see as they prefer to have the pt f/u as an op setting. His 

Newman cont to drain well and hematuria is improving


-cont to hold Aspirin, will not plan on continuing


-Restart Plavix and monitor for recurrence





#Balanitis: cont antifungal cream, will f/u with Urology





#Cough, none noted since admission


-CXR reviewed and no e/o pneumonia


-exam is reassuring


-will monitor for now





#concerns for aspiration


-[facility] has evaluated. Please see their note.


-regular diet with Nectar Thick Liquids





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


-no changes to his chronic left sided weakness


-will hold Aspirin


-Restart Plavix per above





#HTN


-cont home meds





#IDDM with hyperglycemia


-cont home insulin


-start ISS


-A1C is 7.0, which is improved from previous





Plan:


appreciate ID, cont to hold off on abx.


f/u with Urology


restart Plavix today


Salt supplementation today


cont Newman


SCD's








Subjective: slightly confused. Able to follow commands and hold a converstation.


Objective: 


 Vital Signs











Temp Pulse Resp BP Pulse Ox


 


 36.9 C   91   14   157/71 H  93 


 


 10/13/18 07:50  10/13/18 09:14  10/13/18 07:50  10/13/18 09:15  10/13/18 07:50








 Laboratory Results





 10/13/18 04:40 





 10/13/18 04:40 





 











 10/12/18 10/13/18 10/14/18





 05:59 05:59 05:59


 


Intake Total 261 1636 


 


Output Total 975 1400 150


 


Balance -714 236 -150








 











PT  13.9 SEC (12.0-15.0)   10/11/18  11:14    


 


INR  1.05  (0.83-1.16)   10/11/18  11:14    














- Physical Exam


Constitutional: no apparent distress, chronically ill appearing


Eyes: PERRL


Ears, Nose, Mouth, Throat: moist mucous membranes


Cardiovascular: regular rate and rhythym, No edema


Respiratory: no respiratory distress, no rales or rhonchi


Gastrointestinal: normoactive bowel sounds, soft, non-tender abdomen


Skin: warm


Neurologic: AAOx3


Psychiatric: interacting appropriately, not anxious, encephalopathic


Lymph, Heme, Immunologic: No petechiae





ICD10 Worksheet


Patient Problems: 


 Problems











Problem Status Onset


 


Altered mental status Acute  


 


History of ischemic right ICA stroke Acute  


 


Hyponatremia Acute  


 


Cerebrovascular accident (CVA) involving left cerebral hemisphere Acute

## 2018-10-13 NOTE — ASMTCMCOM
CM Note

 

CM Note                       

Notes:

CM met w/ pts son Duke for dispo planning. Duke is interested in getting a hospice 

consult. Referral made to [facility]. [facility] will directly contact pt for an 

informational. CM spoke to [facility] and told them that pt will need a hospital bed at time of 


d/c. CM to follow.







Plan: TBD

 

Date Signed:  10/13/2018 01:46 PM

Electronically Signed By:LATRICE Moore

## 2018-10-14 NOTE — PCMIDPN
Assessment/Plan: 


Assessment:


Bacteriuria with ESBL Klebsiella.  Unclear if this is truly causing urinary 

tract infection.  Patient has baseline leukocytosis that is somewhat chronic.  

He is day 2 Of ertapenem.  He was alert and conversant this morning which the 

nurse relates is a big difference from last night.








Plan:


1. Continue IV ertapenem.


2. Follow signs of encephalopathy confusion.








 


10/14/18 09:48





Subjective: 


Patient is resting in his hospital bed.  He is eating some yogurt with 

assistance from his nurse.  He is speaking appropriately although his speech 

and response is slowed.  No fevers or chills.  No rash.





Objective: 


Ertapenem # 2 Vital Signs











Temp Pulse Resp BP Pulse Ox


 


 36.4 C   62   16   175/69 H  94 


 


 10/14/18 08:09  10/14/18 09:32  10/14/18 08:09  10/14/18 09:32  10/14/18 08:09








 Laboratory Results





 10/13/18 04:40 





 10/14/18 04:04 





 











 10/13/18 10/14/18 10/15/18





 05:59 05:59 05:59


 


Intake Total 1636 300 


 


Output Total 1400 550 


 


Balance 236 -250 














- Physical Exam


General Appearance: WD/WN, alert, no apparent distress, non-toxic


Respiratory: lungs clear, normal breath sounds, No respiratory distress


Cardiac/Chest: regular rate, rhythm, No tachycardia


Skin: normal color, warm/dry, No rash


Neuro/Psych: alert, normal mood/affect, oriented x 3





ICD10 Worksheet


Patient Problems: 


 Problems











Problem Status Onset


 


Altered mental status Acute  


 


History of ischemic right ICA stroke Acute  


 


Hyponatremia Acute  


 


Cerebrovascular accident (CVA) involving left cerebral hemisphere Acute

## 2018-10-14 NOTE — HOSPPROG
Hospitalist Progress Note


Assessment/Plan: 





DIAGNOSES: 


* acute encephalopathy, suspect multifactorial


* Improving today


* hypoglycemic spells despite not receiving his usual metformin doses


* Heart to determine impact of poor oral intake on this but is eating better 

today


* growth of ESBL Klebsiella in urine, uncertain clinical significance but 

treating in case this is playing a role


* question of new prescription for multiple medicines at SNF


* Family states that Abilify Prozac and BuSpar all started recently at nursing 

facility, question if any of these would be impacting his encephalopathy


* mild hyponatremia improving


* balanitis being treated here


* acute for sacral region decubitus pressure injury without evidence of 

infection, present on admission


* Ongoing wound care here with wound care nurse involved


* recent stroke with persistent hemiplegia


* On Plavix and Lipitor,


* Blood pressures are not adequately controlled for this setting


* uncontrolled chronic hypertension despite high doses of Norvasc and lisinopril

, as well as sotalol


* impaired ambulation due to above with high fall risk


* high DVT risk





Seen by me today on hospitalist rounds as well as multidisciplinary rounds


Reviewed by me with Dr Shipman today





PLANS:


* As I understand there will be a palliative care meeting today; depending on 

the results of that conversation may need to make changes in the treatment plan 

to meet the patient and family's goals


* Continue treatment for possible urinary tract infection though not certain 

that that is causing his symptoms at this time, reviewed with Dr. Shipman today 

by me


* Continue Plavix and Lipitor at this time


* Decrease Lantus dose to 70 units, continue off metformin at this time; will 

DC the sliding scale short acting, follow sugars closely


* For now will hold the antidepressants until we can find out whether these 

medicines are supposed to be on his list are not


* Will add further hypertensive therapy


* Add Lovenox for DVT prophylaxis


* Follow sodium


* PT and OT


* Wound care for his decubitus lesions





SUBJECTIVE:


Patient says he is fatigued and sleepy but otherwise feels well


Denies pain nausea or fever symptoms





OBJECTIVE


Vitals reviewed:  Remains hypertensive but otherwise normal without fever


Cardiac Monitor, my review: 





Exam:


alert responsive and answers questions normally


Remains with unilateral weakness from his stroke but no new neurologic changes


skin warm dry color ok


resps not labored


lungs clear BSs


heart regular


abd soft nondistended nontender, bowel sounds present


limbs warm, no edema





iv site ok





Laboratory data:


Had hypoglycemic spell last evening in the 50s and was at 100 this morning, I 

notice he had a.m. Sugars in the 70s and 90s the last couple of days as well, 

no terribly high sugars


Sodium at 9:32 p.m. Rest of chemistry unremarkable








Objective: 


 Vital Signs











Temp Pulse Resp BP Pulse Ox


 


 36.4 C   62   16   175/69 H  94 


 


 10/14/18 08:09  10/14/18 09:32  10/14/18 08:09  10/14/18 09:32  10/14/18 08:09








 Laboratory Results





 10/13/18 04:40 





 10/14/18 04:04 





 











 10/13/18 10/14/18 10/15/18





 06:59 06:59 06:59


 


Intake Total 1636 300 


 


Output Total 1400 550 


 


Balance 236 -250 








 











PT  13.9 SEC (12.0-15.0)   10/11/18  11:14    


 


INR  1.05  (0.83-1.16)   10/11/18  11:14    














- Time Spent With Patient


Time Spent with Patient: greater than 35 minutes


Time Spent with Patient: Greater than 35 minutes spent on this patients care, 

greater than 50% of time spent counseling, educating, and coordinating care 

regarding the above mentioned plan.





ICD10 Worksheet


Patient Problems: 


 Problems











Problem Status Onset


 


Altered mental status Acute  


 


History of ischemic right ICA stroke Acute  


 


Hyponatremia Acute  


 


Cerebrovascular accident (CVA) involving left cerebral hemisphere Acute

## 2018-10-15 NOTE — PCMIDPN
Assessment/Plan: 


Assessment:


Bacteriuria with ESBL Klebsiella.  It remains unclear if this is truly causing 

urinary tract infection.  Patient has baseline leukocytosis that is somewhat 

chronic.  He is day 3 of ertapenem.  He continues to be alert and conversant 

this morning.  Wife maintains that he is not at his peak recovery point that he 

had post CVA.  I reiterated that this is a short 7 day course of ertapenem and 

that there is no role for continued antibiotic use after this completion.








Plan:


1. Continue IV ertapenem.


2. Follow signs of encephalopathy confusion.








 





Subjective: 





Patient is resting in his hospital bed.  He is somewhat somnolent and drips and 

out of sleep.  When he is awake and alert he is able to converse appropriately.

  He denies any complaint.  No fevers or chills.  No abdominal pain.  Seems to 

be tolerating ertapenem without issue.


Objective: 


Ertapenem # 3 Vital Signs











Temp Pulse Resp BP Pulse Ox


 


 36.5 C   68   14   143/68 H  95 


 


 10/15/18 08:00  10/15/18 09:29  10/15/18 08:00  10/15/18 09:29  10/15/18 08:00








 Laboratory Results





 10/13/18 04:40 





 10/15/18 08:12 





 











 10/14/18 10/15/18 10/16/18





 05:59 05:59 05:59


 


Intake Total 300 300 


 


Output Total 550 600 


 


Balance -250 -300 














- Physical Exam


General Appearance: WD/WN, alert, no apparent distress, non-toxic


Respiratory: lungs clear, normal breath sounds, No respiratory distress


Cardiac/Chest: regular rate, rhythm, No tachycardia


Abdomen: non-tender, soft, No distended


Skin: normal color, warm/dry, No rash


Neuro/Psych: alert, normal mood/affect, oriented x 3





ICD10 Worksheet


Patient Problems: 


 Problems











Problem Status Onset


 


Altered mental status Acute  


 


History of ischemic right ICA stroke Acute  


 


Hyponatremia Acute  


 


Cerebrovascular accident (CVA) involving left cerebral hemisphere Acute

## 2018-10-15 NOTE — HOSPPROG
Hospitalist Progress Note


Assessment/Plan: 





DIAGNOSES: 


* acute dehydration today with very poor oral intake last 2 days


* Patient very reluctant to take thickened liquids


* acute encephalopathy, suspect multifactorial


* Worse today likely due to dehydration


* hypoglycemic spells despite not receiving his usual metformin doses


* Much better with lowered dose of Lantus insulin, still off metformin


* growth of ESBL Klebsiella in urine, uncertain clinical significance but 

treating in case this is playing a role


* Is receiving antibiotics but uncertain if these are contributing in any 

positive why


* polypharmacy which is likely contributing to his mentation and other issues


* New medicines that were started at [facility] recently include 

Abilify, BuSpar, Ritalin and hyoscyamine


* I do not think any of these medicines are benefitting him and they certainly 

could be aggravating some of his acute issues


* mild hyponatremia improving


* balanitis being treated here


* acute sacral region decubitus pressure injury without evidence of infection, 

present on admission


* Ongoing wound care here with wound care nurse involved


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


* On Plavix and Lipitor,


* Blood pressures are not adequately controlled for this setting


* uncontrolled chronic hypertension despite high doses of Norvasc and lisinopril

, as well as sotalol


* Better today, question if this is due to his dehydration


* impaired ambulation due to above with high fall risk


* high DVT risk





Seen by me today on hospitalist rounds as well as multidisciplinary rounds


Reviewed by me with Dr Shipman today





PLANS:


* Further palliative care discussions will be ongoing during the rest of his 

hospital stay


* Continue treatment for possible urinary tract infection though not certain 

that that is causing his symptoms at this time, reviewed with Dr. Shipman today 

by me


* Continue Plavix and Lipitor at this time


* Continue decreased Lantus dose at 7 units, continue off metformin at this time

; would not use any sliding scale insulin, and blood keep the goals of glucose 

control modest in his case


* Discontinue the following medicines from the patient's current and home 

medicine list as I think some of them are causing side effects and none of them 

appear to be contributing in a positive way at this time:  Abilify, BuSpar, 

hyoscyamine, and Ritalin


* Consider further antihypertensive therapy if blood pressure goes back up


* Add Lovenox for DVT prophylaxis


* Follow sodium


* PT and OT


* Wound care for his decubitus lesions and penile balanitis





SUBJECTIVE:


Patient denies discomforts


No appetite at all


Very upset about having to take thickened liquids





OBJECTIVE


Vitals reviewed:  Blood pressure is better today, otherwise stable without fever





Exam:


A bit obtunded today and falls asleep quite a bit, weaker voice, disoriented 

today


Appears dehydrated with decreased skin turgor and dry mouth


Remains with unilateral weakness from his stroke but no new focal neurologic 

changes


skin warm dry color ok


resps not labored


lungs clear BSs


heart regular


abd soft nondistended nontender, bowel sounds present


limbs warm, no edema





iv site ok





Laboratory data:


Blood sugars much steadier and in much better range overall with lower Lantus


Sodium stable 132





Objective: 


 Vital Signs











Temp Pulse Resp BP Pulse Ox


 


 36.5 C   61   16   113/60   96 


 


 10/15/18 15:25  10/15/18 15:25  10/15/18 15:25  10/15/18 15:25  10/15/18 15:25








 Laboratory Results





 10/13/18 04:40 





 10/15/18 08:12 





 











 10/14/18 10/15/18 10/16/18





 06:59 06:59 06:59


 


Intake Total 300 300 582


 


Output Total 550 600 460


 


Balance -250 -300 122








 











PT  13.9 SEC (12.0-15.0)   10/11/18  11:14    


 


INR  1.05  (0.83-1.16)   10/11/18  11:14    














- Time Spent With Patient


Time Spent with Patient: greater than 35 minutes


Time Spent with Patient: Greater than 35 minutes spent on this patients care, 

greater than 50% of time spent counseling, educating, and coordinating care 

regarding the above mentioned plan.





ICD10 Worksheet


Patient Problems: 


 Problems











Problem Status Onset


 


Altered mental status Acute  


 


History of ischemic right ICA stroke Acute  


 


Hyponatremia Acute  


 


Cerebrovascular accident (CVA) involving left cerebral hemisphere Acute

## 2018-10-15 NOTE — ASMTCMCOM
CM Note

 

CM Note                       

Notes:

Pt and his family will be meeting with [facility] hospice today. Duke needs to speak to his 

family about where pt will live. Here in [place] w/ him or back in [facility] where his primary 

residence is. The pt and family also needs to figure out w/ they want to go back to the [facility] or 

continue w/ ivabx. CM to follow.



DC Plan:  TBD

 

Date Signed:  10/15/2018 02:44 PM

Electronically Signed By:Marisol Fuller

## 2018-10-16 NOTE — HOSPPROG
Hospitalist Progress Note


Assessment/Plan: 





DIAGNOSES: 


* today with recurrence gross hematuria, with history of recent urologic injury 

from Newman catheter treated elsewhere


* No clots and is draining well


* Will need to recheck his blood counts, and if bleeding persists may need 

irrigation catheter


* acute dehydration today with very poor oral intake last 2 days


* Patient very reluctant to take thickened liquids


* acute encephalopathy, suspect multifactorial


* Worse today likely due to dehydration


* hypoglycemic spells despite not receiving his usual metformin doses


* Much better with lowered dose of Lantus insulin, still off metformin


* growth of ESBL Klebsiella in urine, uncertain clinical significance but 

treating in case this is playing a role


* Is receiving antibiotics but uncertain if these are contributing in any 

positive why


* polypharmacy which is likely contributing to his mentation and other issues


* New medicines that were started at [facility] recently include 

Abilify, BuSpar, Ritalin and hyoscyamine


* I do not think any of these medicines are benefitting him and they certainly 

could be aggravating some of his acute issues


* mild hyponatremia improving


* balanitis being treated here


* acute sacral region decubitus pressure injury without evidence of infection, 

present on admission


* Ongoing wound care here with wound care nurse involved


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


* On Plavix and Lipitor,


* Blood pressures are not adequately controlled for this setting


* uncontrolled chronic hypertension despite high doses of Norvasc and lisinopril

, as well as sotalol


* Better today, question if this is due to his dehydration


* impaired ambulation due to above with high fall risk


* high DVT risk





Seen by me today on hospitalist rounds as well as multidisciplinary rounds


Reviewed by me with Dr butler today





PLANS:


* Continue IV hydration at this time


* Continue efforts to encourage oral intake of thickened fluids


* Hold Lovenox at this time due to gross hematuria via Newman, continue 

hydration follow gross hematuria watching for clots or problems emptying bladder

; repeat blood counts to see that he is not becoming significantly more anemic


* Further palliative care discussions will be ongoing during the rest of his 

hospital stay


* Continue treatment for possible urinary tract infection though not certain 

that that is causing his symptoms at this time, reviewed with Dr. Butler today 

by me


* Continue Plavix and Lipitor at this time (could also consider holding Plavix 

temporarily of hematuria becomes ongoing issue)


* Continue decreased Lantus dose at 7 units, continue off metformin at this time

; would not use any sliding scale insulin, and blood keep the goals of glucose 

control modest in his case


* Discontinued the following medicines from the patient's current and home 

medicine list as I think some of them are causing side effects and none of them 

appear to be contributing in a positive way at this time:  Abilify, BuSpar, 

hyoscyamine, and Ritalin


* Will add some low-dose hydralazine for better blood pressure control with 

stroke history


* Add Lovenox for DVT prophylaxis


* Follow sodium


* PT and OT


* Wound care for his decubitus lesions and penile balanitis





SUBJECTIVE:


Patient does not appear to have pain or discomfort but he is still a fairly 

encephalopathic today and not really possible to get a accurate symptom 

assessment





OBJECTIVE


Vitals reviewed:  Blood pressure i still intermittently high, otherwise stable 

without fever





Exam:


Slightly more alert today and eating and drinking a bit better than yesterday, 

however remains rather obtunded confused and speech is unintelligible


Appears better hydrated after IV fluid overnight with better skin turgor


Remains with chronic unilateral weakness from his old stroke but no new focal 

neurologic changes


skin warm dry color ok


resps not labored


lungs clear BSs


heart regular


abd soft nondistended nontender, bowel sounds present


limbs warm, no edema





Newman catheter draining well but is having fair bit of gross hematuria today





iv site ok





Laboratory data:


Blood sugars reasonably stable in good range at lower Lantus dose and off his 

usual metformin


Sodium stable 132





Objective: 


 Vital Signs











Temp Pulse Resp BP Pulse Ox


 


 36.4 C   70   18   163/69 H  94 


 


 10/16/18 08:57  10/16/18 11:28  10/16/18 08:57  10/16/18 09:00  10/16/18 08:57








 Laboratory Results





 10/13/18 04:40 





 10/15/18 08:12 





 











 10/15/18 10/16/18 10/17/18





 06:59 06:59 06:59


 


Intake Total 300 1464 


 


Output Total 600 835 


 


Balance -300 629 








 











PT  13.9 SEC (12.0-15.0)   10/11/18  11:14    


 


INR  1.05  (0.83-1.16)   10/11/18  11:14    














- Time Spent With Patient


Time Spent with Patient: greater than 35 minutes


Time Spent with Patient: Greater than 35 minutes spent on this patients care, 

greater than 50% of time spent counseling, educating, and coordinating care 

regarding the above mentioned plan.





ICD10 Worksheet


Patient Problems: 


 Problems











Problem Status Onset


 


Altered mental status Acute  


 


History of ischemic right ICA stroke Acute  


 


Hyponatremia Acute  


 


Cerebrovascular accident (CVA) involving left cerebral hemisphere Acute

## 2018-10-16 NOTE — PCMIDPN
Assessment/Plan: 


# Delirium, unclear etiology.  Unfortunately ertapenem toxicity can cause 

worsening confusion.


# bacteriuria versus UTI with ESBL E coli:  Little change in objective measures 

with initiation of ertapenem.  Patient still with impaired mentation by my exam 

today.  Santiago present on admission


# chronic elevated white count no change with treatment of UTI





Recommendations


1. Ertapenem x7 days, stop date 10/20/2018; adjusted MAR


2. Call ID for additional questions 


3. Continue contact precautions 





Subjective: 





Patient stating"I am in a hell hole"


denies pain


Objective: 


 Vital Signs











Temp Pulse Resp BP Pulse Ox


 


 36.4 C   68   18   163/69 H  94 


 


 10/16/18 08:57  10/16/18 09:00  10/16/18 08:57  10/16/18 09:00  10/16/18 08:57








 Laboratory Results





 10/13/18 04:40 





 10/15/18 08:12 





 











 10/15/18 10/16/18 10/17/18





 05:59 05:59 05:59


 


Intake Total 300 1464 


 


Output Total 600 835 


 


Balance -300 629 














- Physical Exam


General Appearance: other (sleepy somewhat uncooperative with orientation 

questions but admits to 2018)


EENT: dry mucous membranes, No thrush


Respiratory: wheezing (Scattered), No accessory muscle use, No crackles


Cardiac/Chest: regular rate, rhythm


Extremities: other (Wasting left side consistent with past dav paresis from CVA

), No pedal edema


Abdomen: non-tender, soft


Male Genitalia: santiago


Skin: pallor, No rash


Neuro/Psych: other (Sleepy and somewhat uncooperative)





- Line/s


  ** PIV


Lines: No drainage, No erythema





ICD10 Worksheet


Patient Problems: 


 Problems











Problem Status Onset


 


Altered mental status Acute  


 


History of ischemic right ICA stroke Acute  


 


Hyponatremia Acute  


 


Cerebrovascular accident (CVA) involving left cerebral hemisphere Acute

## 2018-10-17 NOTE — HOSPPROG
Hospitalist Progress Note


Assessment/Plan: 


84 yo male admitted with AMS due to dehydration and possibly UTI





#Acute on chronic Encephalopathy, etiology is likely multifactorial


-waxing and weaning. Very encephalopathic today. Similar to admission





#Dehydration


-I believe that he has been having issues with dehydration since before this 

admission. He was dehydrated on admission and his Encephalopathy improved with 

IVF. He has not been able to take a normal amount of PO fluids and dehydration 

has recurred


-cont IVF at 75ml/hr. 





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


-on Admission he had a mixed picture of SiADH and Dehydration. Despite 

improvement of hydration status with IVF, Na remained low and Urine testing was 

c/w inappropriate release of Na indicating possible SiADH. Since fluid 

restriction was not an option he was started on salt replacement (bid dosing) 

and Na has since improved. However with BP elevated, will repeat testing and 

hold Salt for now. 





#Recent multidrug Klebsiella UTI with UCx c/w Klebsiella in a pt with 

indwelling Santiago


-unclear if active infection is present


-ID is following


-cont with Meropenem





#BPH, Urinary Retention, has santiago in place





#Hematuria, resolved. Now back on Plavix. Holding Aspirin





#Balanitis: cont antifungal cream, will f/u with Urology





#cough, intermittent. No supplemental O2 needs. CXR previously negative. Will 

monitor for now. cont IVF per above





#concerns for aspiration


-SLP has evaluated. Please see their note.


-regular diet with Nectar Thick Liquids


-He will likely cont to have problems with aspiration intermittently, worse 

when encephalopathic





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


-no changes to his chronic left sided weakness


-will hold Aspirin


-cont Plavix 





#HTN


-cont Amlodipine, Sotalol, and Lisinopril


-Will increase Hydralazine


-Salt tablets held per above





#IDDM with hyperglycemia


-cont home insulin


-start ISS


-A1C is 7.0, which is improved from previous


-hold Metformin





Plan:


Palliative Care will meat with family today. There was discussion about hospice 

at the beginning of the week as the pt had communicated to his son about it. 

However, reportedly, the family is not all in agreement. I believe that hospice 

is appropriate. 


appreciate ID, cont Meropenem


f/u with Urology


SCD's





discussed with team during team rounds








Subjective: encephalopathic. on room air. no cp or sob. no further hematuria


Objective: 


 Vital Signs











Temp Pulse Resp BP Pulse Ox


 


 36.5 C   68   16   156/72 H  97 


 


 10/17/18 12:10  10/17/18 12:10  10/17/18 12:10  10/17/18 12:10  10/17/18 12:10








 Microbiology











 10/11/18 15:15 Blood Culture - Final





 Blood 


 


 10/11/18 15:10 Blood Culture - Final





 Blood 








 Laboratory Results





 10/17/18 04:37 





 10/17/18 04:37 





 











 10/16/18 10/17/18 10/18/18





 05:59 05:59 05:59


 


Intake Total 1464 1206 


 


Output Total 835 1800 


 


Balance 629 -594 








 











PT  13.9 SEC (12.0-15.0)   10/11/18  11:14    


 


INR  1.05  (0.83-1.16)   10/11/18  11:14    














- Physical Exam


Constitutional: chronically ill appearing


Eyes: PERRL


Ears, Nose, Mouth, Throat: moist mucous membranes, hearing normal


Cardiovascular: regular rate and rhythym, No edema


Respiratory: no respiratory distress, no rales or rhonchi, reduced air movement


Gastrointestinal: normoactive bowel sounds, soft, non-tender abdomen


Skin: warm


Musculoskeletal: generalized weakness


Neurologic: No AAOx3


Psychiatric: not anxious, encephalopathic


Lymph, Heme, Immunologic: No petechiae





ICD10 Worksheet


Patient Problems: 


 Problems











Problem Status Onset


 


Altered mental status Acute  


 


History of ischemic right ICA stroke Acute  


 


Hyponatremia Acute  


 


Cerebrovascular accident (CVA) involving left cerebral hemisphere Acute

## 2018-10-17 NOTE — GCON
DATE OF CONSULTATION:  10/17/2018



REASON FOR CONSULT:  Gross hematuria, altered mental status and urinary retention.



HISTORY OF PRESENT ILLNESS:  This is a pleasant, 83-year-old male, who has been seen in our office fo
r years of BPH and intermittent gross hematuria.  He presented on 10/11/2018, with increasing confusi
on, recurrent gross hematuria and altered mental status.  His son reports that his mental status had 
been significantly declining as of that day. He was sent to the emergency room from our office and ha
s been here since. He has recently had multiple strokes and is on anticoagulation.  He was treated at
 [facility] on 09/09 for a Klebsiella infection and was on IV antibiotics.  The family report
s that cognition was doing better while on antibiotics, but then after stopping them quickly became m
ore confused again.



PAST MEDICAL HISTORY:  CAD, HTN, IDDM, HLD, dementia, BPH, indwelling Newman, spinal stenosis.



PAST SURGICAL HISTORY:  TURP, cataracts, coronary angiography, knee surgery.



SOCIAL HISTORY:  . Family is with him. Never smoker.



FAMILY HISTORY:  Noncontributory.



ALLERGIES:  Include: Sudafed, Augmentin, dextromethorphan, guaifenesin.



MEDICATIONS:  Please see med rec.



REVIEW OF SYSTEMS:  Unable to obtain this due to his cognitive state.



PHYSICAL EXAM:  VITAL SIGNS:  Blood pressure 156/72, heart rate 68, respirations 16, O2 97% on room a
ir, temperature 36.5.  GENERAL: The patient is a well-developed, well-nourished male, in no acute dis
tress.  HEENT:  Normocephalic, atraumatic.  Extraocular movements intact.  NECK:  Supple.  No lymphad
enopathy.  Trachea midline.  RESPIRATORY: The patient is coughing with a productive cough.  GI: Abdom
en was soft, nondistended, nontender to palpation. : No CVA tenderness.  Newman catheter in place.  
No gross hematuria in urine at that time. INTEGUMENT:  No obvious rashes or lesions. MUSCULOSKELETAL:
  Sitting and not moving his extremities. NEURO: The patient was not oriented, somewhat alert.



LABORATORY DATA:  White blood cell count 11.57, hemoglobin 11.3, hematocrit 34.3, platelets 392.  Nicky
maximus: Sodium 138, potassium 4, carbon dioxide 27, anion gap 7, BUN 10, creatinine 0.5, glucose 141.
  Urinalysis was positive for blood and infection.  Tox screen is negative.



ASSESSMENT AND PLAN:  Altered mental status, bacteria in his urine, gross hematuria, urinary retentio
n.  This is a complicated, likely multifactorial reason for his altered mental status and agree with 
recommendations to continue treatment for a possible Klebsiella urinary tract infection, although it 
is unclear if this is an acute or a colonized infection. Will leave Newman in place for urinary retent
ion.  Hematuria has been intermittent at least for the last year and a half and maybe somewhat due to
 BPH that has been managed historically in our office. 



We will continue to follow along with this patient.  Thank you for allowing us to participate in his 
care.





Job #:  [id number]

## 2018-10-17 NOTE — ASMTCMCOM
CM Note

 

CM Note                       

Notes:

Patient plan of care reviewed in rounds. Palliative care meeting deemed appropriate at this time 

given the patient has not really responded to medical therapies after 5 days. His son Duke, his 


daughter and wife were all present with myself and Galindo Gonzalez. We had a long discussion about 


Adam's life, where he has been and what may lay ahead regarding palliative care versus home with 

hospice. The family is adamant that he not return to [facility] in [place]. His wife Angi states 

she believes if he were to suffer a natural death that this be allowed without CPR. The family is 

concerned that the santiago catheter may be playing a role in his acute illness. It was decided to 

trial him without a santiago. CM to follow for needs. 



Plan: TBD

 

Date Signed:  10/17/2018 02:05 PM

Electronically Signed By:Dianne Olmos RN

## 2018-10-17 NOTE — WOCRNPDOC
WOCRN Advanced Assessment Note





- Skin Integrity Problem, Advanced Assess





  ** Right Ischial Tuberosity Pressure Injury


Dressing Type: Open to Air


Iris Wound Tissue: Blanching, Erythema


Wound Bed Constitution: Healed


Pressure Injury Stage: Deep Tissue Injury (DTI)


Skin Integrity Problem Comment: Healed DTI with blanching erythema remaining. 

No open areas. Wound care will sign off.





  ** Sacrum Pressure Injury


Dressing Type: Open to Air


Exudate Amount: None


Integumentary Issue Intervention: Lotion/Cream Applied (Dimethicone to sacrum/

MAD to scrotum)


Iris Wound Tissue: Blanching, Erythema, Xerotic


Site Measurement - Head-to-Toe Length X Width X Depth (cm): 6x7x0


Pressure Injury Present on Admit: Yes (per patient's daughter)


Skin Integrity Problem Comment: Large area of mixed blanching/non blanching 

tissue with some scarring. All intact. Peeling epidermis. Per patient's 

daughter his urologist diagnosed this area as a pressure injury a week or two 

ago when it was open. Likely the pressure injuries were partial thickness as 

there is not significant scarring and per her report the open areas healed 

quickly (within a few days/a week). Wound care will sign off. Please reconsult 

prn worsening wounds/opening of wounds.

## 2018-10-18 NOTE — ASMTCMCOM
CM Note

 

CM Note                       

Notes:

Patient plan of care reviewed in rounds. His mentation is poorer today and he is difficult to 

wake. He will need santiago reinserted. Patient's son and daughter present for rounds. DC plan of care 


unclear at this time but patient will likely need SNF or 24 hour care at home. Family aware of 

options of palliative vs hospice care. CM to follow. 



 Plan: TBD

 

Date Signed:  10/18/2018 12:24 PM

Electronically Signed By:Dianne Olmos RN

## 2018-10-18 NOTE — HOSPPROG
Hospitalist Progress Note


Assessment/Plan: 


84 yo male admitted with AMS due to dehydration and possibly UTI





#Acute on chronic Encephalopathy, etiology is likely multifactorial


-waxing and weaning


-Encephalopathy persists





#Dehydration


-I believe that he has been having issues with dehydration since before this 

admission. He was dehydrated on admission and his Encephalopathy improved with 

IVF. He has not been able to take a normal amount of PO fluids and dehydration 

has recurred


-cont IVF at 75ml/hr. 





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


-on Admission he had a mixed picture of SiADH and Dehydration. Despite 

improvement of hydration status with IVF, Na remained low and Urine testing was 

c/w inappropriate release of Na indicating possible SiADH. Since fluid 

restriction was not an option he was started on salt replacement (bid dosing) 

and Na has since improved.


-cont to hold Salt tabs for now. repeat Na in a.m.





#Recent multidrug Klebsiella UTI with UCx c/w Klebsiella in a pt with 

indwelling Newman


-unclear if active infection is present


-ID is following


-cont with Meropenem





#BPH, Urinary Retention





#Hematuria, resolved. Now back on Plavix. Holding Aspirin





#Balanitis: resolved. Treated with antifungal crema





#cough, resolved





#concerns for aspiration


-SLP has evaluated. Please see their note.


-regular diet with Nectar Thick Liquids


-He will likely cont to have problems with aspiration intermittently, worse 

when encephalopathic


-on RA, no signs of resp distress





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


-no changes to his chronic left sided weakness


-will hold Aspirin


-cont Plavix 





#HTN


-cont Amlodipine, Sotalol, and Lisinopril


-Will increase Hydralazine


-Salt tablets held per above





#IDDM with hyperglycemia


-cont home insulin


-start ISS


-A1C is 7.0, which is improved from previous


-hold Metformin








Plan:


-I met with Palliative care team and the family yesterday. They are still 

resistant to pursuing hospice and are hoping there is reversibility in the pt's 

condition. They agreed to make the pt DNR. They requested that the Newman be 

removed but replaced if there was urinary retention. We discussed this morning 

that he was still retaining urine and the family has agreed to replace it. They 

want to see if he has any improvement today and will consider their options 

pending his clinical course


-cont with Ertapenem. ID is following.


-Cont with IVF


-cont with BP mgmt. Will increase Hydralazine again. 


-SCD's 


Subjective: encephalopathic. retaining urine. VSS


Objective: 


 Vital Signs











Temp Pulse Resp BP Pulse Ox


 


 37.2 C   78   16   174/87 H  94 


 


 10/18/18 08:30  10/18/18 08:30  10/18/18 08:30  10/18/18 08:30  10/18/18 08:30








 Laboratory Results





 10/17/18 04:37 





 10/18/18 09:32 





 











 10/17/18 10/18/18 10/19/18





 05:59 05:59 05:59


 


Intake Total 1206 1053 


 


Output Total 1800 1751 


 


Balance -594 -698 








 











PT  13.9 SEC (12.0-15.0)   10/11/18  11:14    


 


INR  1.05  (0.83-1.16)   10/11/18  11:14    














- Time Spent With Patient


Time Spent with Patient: greater than 35 minutes


Time Spent with Patient: Greater than 35 minutes spent on this patients care, 

greater than 50% of time spent counseling, educating, and coordinating care 

regarding the above mentioned plan.





- Physical Exam


Constitutional: chronically ill appearing


Eyes: PERRL


Ears, Nose, Mouth, Throat: moist mucous membranes, hearing normal


Cardiovascular: regular rate and rhythym, no murmur, rub, or gallop


Respiratory: no respiratory distress, no rales or rhonchi, clear to auscultation


Gastrointestinal: normoactive bowel sounds


Skin: warm


Neurologic: AAOx3


Psychiatric: encephalopathic


Lymph, Heme, Immunologic: No petechiae





ICD10 Worksheet


Patient Problems: 


 Problems











Problem Status Onset


 


Altered mental status Acute  


 


History of ischemic right ICA stroke Acute  


 


Hyponatremia Acute  


 


Cerebrovascular accident (CVA) involving left cerebral hemisphere Acute

## 2018-10-19 NOTE — ASMTCMCOM
CM Note

 

CM Note                       

Notes:

Plan of care reviewed in rounds. Met with family multiple times to review options of hospice care 

as inpatient versus at home hospice. They are going to go look at [facility] as inpatient there 

would be preferable over [facility] as the patient's  son, Lyndsey works in [place]. I have placed 

referral to [facility] for possible GIP bed options. Referral also sent to [facility] as well. CM to 

follow for needs.



Plan: hopefully discharge to SNF with hospice vs home hospice.



 

 

Date Signed:  10/19/2018 03:00 PM

Electronically Signed By:Dianne Olmos RN

## 2018-10-19 NOTE — HOSPPROG
Hospitalist Progress Note


Assessment/Plan: 


84 yo male admitted with AMS due to dehydration and possibly UTI





#Acute on chronic Encephalopathy, etiology is likely multifactorial


-waxing and weaning


-Encephalopathy persists





#Dehydration


-I believe that he has been having issues with dehydration since before this 

admission. He was dehydrated on admission and his Encephalopathy improved with 

IVF. He has not been able to take a normal amount of PO fluids and dehydration 

has recurred


-cont IVF at 75ml/hr. 





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


-on Admission he had a mixed picture of SiADH and Dehydration. Despite 

improvement of hydration status with IVF, Na remained low and Urine testing was 

c/w inappropriate release of Na indicating possible SiADH. Since fluid 

restriction was not an option he was started on salt replacement (bid dosing) 

and Na has since improved.


-cont to hold Salt tabs for now. repeat Na in a.m.





#Recent multidrug Klebsiella UTI with UCx c/w Klebsiella in a pt with 

indwelling Newman


-unclear if active infection is present


-ID is following


-cont with Meropenem





#BPH, Urinary Retention





#Hematuria, resolved. Now back on Plavix. Holding Aspirin





#Balanitis: resolved. Treated with antifungal crema





#cough, resolved





#concerns for aspiration


-SLP has evaluated. Please see their note.


-regular diet with Nectar Thick Liquids


-He will likely cont to have problems with aspiration intermittently, worse 

when encephalopathic


-on RA, no signs of resp distress





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


-no changes to his chronic left sided weakness


-will hold Aspirin


-cont Plavix 





#HTN


-Amlodipine, Sotalol, and Lisinopril


-Hydralazine


-Salt tablets held per above





#IDDM with hyperglycemia


-cont home insulin


-start ISS


-A1C is 7.0, which is improved from previous


-hold Metformin








Plan:


Lengthy discussion with pt and his family. His MDPOA wishes to look at 

obtaining hospice. We will stop all oral meds. Likely d/c tomorrow or Sunday. 


They have requested that we cont Plavix to prevent a stroke


cont IVF


Regular diet


await official decision from family about hospice


cont with abx until tomorrow 


-SCD's 


Subjective: more awake. still on IVF


Objective: 


 Vital Signs











Temp Pulse Resp BP Pulse Ox


 


 36.4 C   73   15   160/80 H  95 


 


 10/19/18 08:00  10/19/18 09:12  10/19/18 08:00  10/19/18 09:12  10/19/18 08:00








 Laboratory Results





 10/19/18 04:38 





 10/19/18 04:38 





 











 10/18/18 10/19/18 10/20/18





 05:59 05:59 05:59


 


Intake Total 1053 999 


 


Output Total 1751 1125 


 


Balance -698 -126 








 











PT  13.9 SEC (12.0-15.0)   10/11/18  11:14    


 


INR  1.05  (0.83-1.16)   10/11/18  11:14    














- Time Spent With Patient


Time Spent with Patient: greater than 35 minutes


Time Spent with Patient: Greater than 35 minutes spent on this patients care, 

greater than 50% of time spent counseling, educating, and coordinating care 

regarding the above mentioned plan.





- Physical Exam


Constitutional: no apparent distress


Eyes: PERRL


Ears, Nose, Mouth, Throat: moist mucous membranes


Cardiovascular: regular rate and rhythym


Respiratory: no respiratory distress, reduced air movement


Gastrointestinal: normoactive bowel sounds, soft, non-tender abdomen


Skin: warm


Neurologic: No AAOx3


Psychiatric: encephalopathic


Lymph, Heme, Immunologic: No petechiae





ICD10 Worksheet


Patient Problems: 


 Problems











Problem Status Onset


 


Altered mental status Acute  


 


History of ischemic right ICA stroke Acute  


 


Hyponatremia Acute  


 


Cerebrovascular accident (CVA) involving left cerebral hemisphere Acute

## 2018-10-20 NOTE — HOSPPROG
Hospitalist Progress Note


Assessment/Plan: 


82 yo male with hx of stroke admitted with AMS due to dehydration and possibly 

UTI. He has been in and out of hospital and rehab for quite some time and was 

admitted from a SNF. He was found to have dehydration. He has a hx of 

Klebsiella and it was unclear if a recurrence of this was contributing to his 

AMS. Id was consulted and ultimately he was treated with Abx. His cognition 

improved initially with IVF but then had waxing and weaning. He is unable to 

make medical decisions. His son is his MDPOA. Since admission there has been 

concern for aspiration although he has not had a pneumonia.





Ultimately the family has chosen hospice and this is currently being set up. 

All oral non pertinent meds have been stopped. He does cont on IVF until a 

final decision regarding hospice is made. He has not been able to take 

sufficient PO intake to maintain hydration. 





His last day of abx is today and these have not had a much impact on his 

overall condition. 





DDX.


#Acute on chronic Encephalopathy, etiology is likely multifactorial


-waxing and weaning





#Dehydration, resolved


-I believe that he has been having issues with dehydration since before this 

admission. He was dehydrated on admission and his Encephalopathy improved with 

IVF. He has not been able to take a normal amount of PO fluids and dehydration 

has recurred


-cont IVF at 75ml/hr. 





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


-on Admission he had a mixed picture of SiADH and Dehydration. Despite 

improvement of hydration status with IVF, Na remained low and Urine testing was 

c/w inappropriate release of Na indicating possible SiADH. Since fluid 

restriction was not an option he was started on salt replacement (bid dosing) 

and Na has since improved. Salt tablets are now being held and Na has not been 

rechecked given decision towards hospice





#Recent multidrug Klebsiella UTI with UCx c/w Klebsiella in a pt with 

indwelling Newman


-unclear if active infection is present


-ID is following


-cont with Meropenem, last day today





#BPH, Urinary Retention





#Hematuria, resolved. Now back on Plavix. Holding Aspirin. The family is 

requesting continuing Plavix as they don't want to take the chance that the 

patient will have a stroke even if they have chosen hospice as apparently not 

having another stroke was a patient request





#Balanitis: resolved. Treated with antifungal cream





#cough, resolved





#concerns for aspiration


-regular diet with Nectar Thick Liquids, but now back to regular diet given 

hospice








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


-no changes to his chronic left sided weakness


-will hold Aspirin


-cont Plavix 





#HTN


-off Amlodipine, Sotalol, and Lisinopril and now managing with IV Hydralazine





#IDDM


-holding insulin


-hold Metformin





-SCD's 


Subjective: no overnight events. Awaiting decison from family regarding hospice


Objective: 


 Vital Signs











Temp Pulse Resp BP Pulse Ox


 


 36.7 C   77   16   167/77 H  93 


 


 10/20/18 08:00  10/20/18 08:00  10/20/18 08:00  10/20/18 08:00  10/20/18 08:00








 Laboratory Results





 10/19/18 04:38 





 10/19/18 04:38 





 











 10/19/18 10/20/18 10/21/18





 05:59 05:59 05:59


 


Intake Total 999 903 


 


Output Total 1125 550 


 


Balance -126 353 








 











PT  13.9 SEC (12.0-15.0)   10/11/18  11:14    


 


INR  1.05  (0.83-1.16)   10/11/18  11:14    














- Physical Exam


Constitutional: no apparent distress, chronically ill appearing


Eyes: PERRL


Ears, Nose, Mouth, Throat: moist mucous membranes, hearing normal


Cardiovascular: regular rate and rhythym


Respiratory: no respiratory distress


Gastrointestinal: normoactive bowel sounds, soft, non-tender abdomen, no 

palpable masses


Skin: No induration


Psychiatric: encephalopathic


Lymph, Heme, Immunologic: No petechiae





ICD10 Worksheet


Patient Problems: 


 Problems











Problem Status Onset


 


Altered mental status Acute  


 


History of ischemic right ICA stroke Acute  


 


Hyponatremia Acute  


 


Cerebrovascular accident (CVA) involving left cerebral hemisphere Acute

## 2018-10-21 NOTE — ASMTCMCOM
CM Note

 

CM Note                       

Notes:

Spoke with [facility] and [facility] spoke to Adam Wall's son, who is agreeable to stretcher 

transport to the care center which is most appropriate. [facility] to arrange transport. Final orders sent 


to [facility] via allscripts. PCS form and code status ready for transport pickup. CM available should 

other needs arise.



Plan: DC to inpatient hospice at [facility].

 

Date Signed:  10/21/2018 10:14 AM

Electronically Signed By:Dianne Olmos RN

## 2018-10-21 NOTE — PDIAF
- Diagnosis


Diagnosis: Encephalopathy


Code Status: Do Not Resuscitate





- Medication Management


Discharge Medications: Refer to the Discharge Home Medication list for PRN 

reason.





- Orders


Services needed: Registered Nurse, Certified Nursing Aide


Isolation Type: Contact Isolation


Diet Texture: Regular Texture Diet, Thin Liquids, Nectar Thick Liquids


Additional Instructions: 


Please follow up within 3- 4 weeks of discharge with outpatient Wound Healing 

Center if you continue to have issues with your wounds: You may reach them at 

[phone] for an appointment and continued management of your wounds. Please 

call them asa to schedule your appointment as they fill up quickly. If before 

that time you have any issues please follow up with your PCP.


Wound care:


Bedsore (Pressure injury) care:


You have a deep tissue pressure injury (also known as a bedsore) on your sit 

bone (ishium.) To help heal this wound and avoid further injury please do the 

followin.   Reposition yourself frequently, at least every 15 minutes when sitting. We 

recommend sitting on an air cushion. Please never use a doughnut. 


2.   When youre in bed, try to rest on your side as much as possible, and 

change position every two hours (for example, turn or tilt from your right side 

toward your left).~ If you sleep on a sleep number or medical bed, keep the 

head of the bed below 30 degrees and keep all pressure off your low back for at 

least 5 minutes at least every two hours.~ 


3.   As needed, you may use Calazime, dimethicone moisture barrier cream, or 

any over-the-counter diaper rash cream to help prevent or treat a moisture-

related rash to your bottom area and buttocks. 


4.   


Please contact [facility] outpatient [facility] for an appointment, at [phone], If your wounds re/open or dont improve, or if you have any further 

questions or concerns. 


Louise Wagner [facility]





- Follow Up Care


Current Providers and Referrals: 


Frankel,Zara, MD [Primary Care Provider] - As per Instructions

## 2018-10-21 NOTE — GDS
DISCHARGE DIAGNOSES:  

1.  Acute encephalopathy. 

2.  Bacteriuria versus urinary tract infection with extended-spectrum beta-
lactamase Escherichia coli.  

3.  Chronic leukocytosis. 

4.  Dehydration. 

5.  Subacute hyponatremia. 

6.  BPH. 

7.  Hematuria. 

8.  Concern for aspiration.  

9.  Recent right internal capsule stroke with persistent left-sided weakness. 

10.  Hypertension. 

11.  Diabetes with hyperglycemia.



HISTORY OF PRESENT ILLNESS:  An 83-year-old male with recent right internal 
capsule stroke with residual left-sided weakness who was discharged to [facility] 
[facility] on aspirin and Plavix.  He was then discharged to a SNF in [place] in 
which he declined and was transferred to rehab, [facility] in [place].  He has 
longstanding BPH and urinary retention and had a Newman trauma while at rehab.  
He had urine culture there 09/09, which showed Klebsiella pneumonia with multi-
drug resistance and was treated with meropenem for 14 days.  He presented here 
with increased confusion.  No fevers.  Has had an intermittent cough.



HOSPITAL COURSE BY PROBLEM:  

1.  Acute encephalopathy:  Multifactorial with recent stroke, hyponatremia and 
possible UTI.

2.  Dehydration:  Has been hydrated here with normal saline.

3.  Subacute hyponatremia:  Multifactorial with dehydration and SIADH.  

4.  Recent multi-drug Klebsiella UTI:  Secondary to indwelling Newman.  ID was 
consulted and continued on meropenem.

5.  Hematuria.  This has resolved.

6.  Concern for aspiration.  Regular diet with nectar-thick liquids.

7.  Recent right internal capsule stroke with persistent left-sided weakness:  
He is on aspirin and Plavix, but with hematuria, aspirin was discontinued.  
Given that he is going to hospice, I think it is reasonable to discontinue all 
medications to reduce pill burden.  Will continue only Plavix per family 
request.

8.  Hypertension:  Will discontinue all medications with hospice.

9.  Goals:  By partner, Dr. Neville, met with family and son who is AJ who 
has opted for hospice.  He will discharge to [facility] today.



DISPOSITION:  Patient is stable for discharge to [facility].



MEDICATIONS:  Continue only Plavix per family request.



PHYSICAL EXAMINATION:  VITAL SIGNS:  Today, temperature 36.9 blood pressure 155/
80, heart rate in the 80s, respirations 18.  GENERAL:  He is fatigued, lying in 
bed, in no acute distress.  HEENT:  PERRLA.  Moist mucous membranes CV:  
Regular rate and rhythm.  LUNGS:  Clear.  ABDOMEN:  Soft, nontender.  No 
grimace with palpation.  NEURO:  No focal deficits.  PSYCH:  Mumbling, 
nonsensical. 



Time spent on discharge:  Greater 30 minutes coordinating with Case Management 
for discharge to [facility].





Job #:  [id number]

MTDD

## 2019-03-25 NOTE — SOAPPROG
LOV 5-20-19   Please advise   SOAP Progress Note


Assessment/Plan: 


Assessment:





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 

hemiparesis in a left-hand-dominant man.  


* Initial functional independence measure 38 on 6/1/2018, improved to 45 as of 6 /8/2018, and to 53 as of 6/13/2018.  Moderate assist for bed mobility.  Delayed 

balance reactions.  Difficulty with multitasking and maintaining balance.  

Squat pivot transfer require moderate assistance.  Ambulated 10 ft at the rail 

with 2 assist.  Upper body dressing requires minimal to moderate assistance, 

lower body dressing requires maximal assistance.  Poor motor planning.  Bath 

transfer required total assist and toileting required total assist of 2. He was 

able to bathe with moderate assistance.  He has recovered movement in all 

planes with the left upper extremity but not yet functional.


* Continue PT and OT to optimize mobility and activities of daily living to the 

standby assist or contact guard assist level.





Dysarthria and history of dementia.


* Working memory is very impaired.  Also with decreased attention, organization

, initiation and problem-solving.


* Continue Speech and Language Pathology.  Observe for decline in cognition 

with discontinuation of donepezil.





Secondary prevention of cerebrovascular accident/neuro recovery.  


* Continue aspirin, clopidogrel, blood pressure and lipid control.  


* Initiate fluoxetine for neuro recovery at 10 mg daily beginning 05/31/2018.  

Titrated to 20 mg p.o. daily starting 6/5/2018.





Hypertension.  Continue lisinopril 20 mg QD and amlodipine 2.5 mg QD.  Adequate 

control.





Diabetes mellitus type 2.  


* Increased metformin from 500 mg p.o. b.i.d to 850 mg BID starting 5/31/2018.  

Titrated further to 1000 mg twice daily starting 6/5/2018.


* Increase sitagliptin from 50 mg q.day to 100 mg q.day starting 6/16/2018.


* Continue insulin glargine 9 units at HS.  Continue insulin lispro sliding 

scale.  Goal is to titrate oral medications for no need of daytime insulin.


* Per hospital discharge information, his hemoglobin A1c was 8.5%.Given his age 

and comorbidities, a reasonable goal would be 8%.





Bowel and bladder incontinence.


* Continue condom catheter at night.


* Scheduled toileting.


* No urinary retention.  Discontinue donepezil starting 6/14/2018.  Observe for 

improvement in urinary frequency verses decline in cognition.





Weight loss in recent months.


* Related to influenza, shingles and secondary Staphylococcus infection.


* Consultation with dietitian.





Fatigue.


* Laboratory evaluation 6/15/2018 with normal renal and hepatic function and 

normal TSH.





Anemia.  New since admission to inpatient rehabilitation.





Dyslipidemia.  Continue atorvastatin.





History of cardiac dysrhythmia, unclear what the rhythm abnormality was.  He is 

on sotalol, which also contributes to blood pressure control.





Risk for QT prolongation with concurrent donepezil and sotalol.  Reviewed EKG 

from [facility].  No QT prolongation.





Possible peripheral vascular disease with cyanotic and cold feet.  Secondary 

prevention for cerebrovascular accident and coronary artery disease will also 

serve to prevent worsening of circulation to his lower extremities.





Cough,  due to seasonal allergies.  Continue cetirizine.





Chronic leukocytosis.  He had no signs or symptoms of infection in the 

hospital.  The differential was primary lymphocytes, and the hospital 

physicians queried whether he has an underlying leukemia, and per hospital 

records, the wife reported that he had a long history of leukocytosis, had seen 

oncologist in [place], and no further evaluation or treatment was advised 

at that time.





Spinal stenosis.  He has been placed on spinal precautions.  It is unclear if 

these are truly necessary, but it is worthwhile to prevent any complications 

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 

central canal stenosis.


* Back pain improved with initiation of low-dose gabapentin.





Prophylaxis.  He is at elevated risk for DVT given his age and hemiparesis.  

Will initiate enoxaparin 40 mg subcutaneous daily and will provide sequential 

compression devices to his legs at night.





DISPOSITION:  Continues to need 24 hr care and ambulation is not functional.  

Son prefers to take him home though there are multiple steps to enter and 

within the house.  Will need considerable home modifications.  SNF remains an 

alternative.  Family is visit and several SNFs.  Set discharge date of 6/29/ 2018.





FOLLOWUP:  He had followup advised in 2 weeks with the neurologist in [place], Dr. Bhatia, though it seems likely that he will seek followup with a 

neurologist in [place] where his son lives.





PRIMARY CARE PROVIDER:  Zara P. Frankel, MD, in [place].








06/15/18 10:30





Subjective: 





Complains of left heel discomfort while in bed.  Sleeping well.  No cough or 

dyspnea, no fevers or chills.


Objective: 





 Vital Signs











Temp Pulse Resp BP Pulse Ox


 


 37.0 C   65   12   119/62   93 


 


 06/15/18 06:25  06/15/18 09:09  06/15/18 06:25  06/15/18 09:09  06/15/18 06:25








 Laboratory Results





 06/15/18 06:00 





 06/15/18 06:00 





 











 06/14/18 06/15/18 06/16/18





 05:59 05:59 05:59


 


Intake Total 1540 500 240


 


Output Total 850 1000 


 


Balance 690 -500 240














Physical Exam





- Physical Exam


General Appearance: WD/WN, alert, no apparent distress


Respiratory: No respiratory distress, No accessory muscle use


Cardiac/Chest: edema (Trace bilateral lower extremities pretibial)


Skin: other (Left heel nontender, no skin abnormalities.)


Neuro/Psych: alert, normal mood/affect, abnormal cerebellar tests, motor 

weakness (Left lower greater than left upper extremity)





ICD10 Worksheet


Patient Problems: 


 Problems











Problem Status Onset


 


Cerebrovascular accident (CVA) involving left cerebral hemisphere Acute  














- ICD10 Problem Qualifiers


(1) Cerebrovascular accident (CVA) involving left cerebral hemisphere